# Patient Record
Sex: MALE | Race: WHITE | NOT HISPANIC OR LATINO | Employment: OTHER | URBAN - METROPOLITAN AREA
[De-identification: names, ages, dates, MRNs, and addresses within clinical notes are randomized per-mention and may not be internally consistent; named-entity substitution may affect disease eponyms.]

---

## 2017-06-24 ENCOUNTER — HOSPITAL ENCOUNTER (EMERGENCY)
Facility: HOSPITAL | Age: 27
Discharge: PSYCHIATRIC OR SUBSTANCE USE FACILITY | End: 2017-06-25
Attending: EMERGENCY MEDICINE | Admitting: EMERGENCY MEDICINE
Payer: MEDICAID
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
## 2017-06-24 DIAGNOSIS — T65.92XA SUICIDE ATTEMPT BY SUBSTANCE OVERDOSE, INITIAL ENCOUNTER: ICD-10-CM

## 2017-06-24 DIAGNOSIS — T42.4X2A POISONING BY BENZODIAZEPINES, INTENTIONAL SELF-HARM, INITIAL ENCOUNTER: ICD-10-CM

## 2017-06-24 DIAGNOSIS — F19.94 SUBSTANCE INDUCED MOOD DISORDER: ICD-10-CM
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
## 2017-06-24 DIAGNOSIS — T51.0X2A: ICD-10-CM

## 2017-06-24 DIAGNOSIS — T14.91XA SUICIDE ATTEMPT: Primary | ICD-10-CM

## 2017-06-24 LAB
ALBUMIN SERPL BCP-MCNC: 3.8 G/DL
ALP SERPL-CCNC: 68 U/L
ALT SERPL W/O P-5'-P-CCNC: 87 U/L
AMPHET+METHAMPHET UR QL: NEGATIVE
ANION GAP SERPL CALC-SCNC: 10 MMOL/L
APAP SERPL-MCNC: <3 UG/ML
AST SERPL-CCNC: 46 U/L
BARBITURATES UR QL SCN>200 NG/ML: NEGATIVE
BASOPHILS # BLD AUTO: 0.01 K/UL
BASOPHILS NFR BLD: 0.1 %
BENZODIAZ UR QL SCN>200 NG/ML: NORMAL
BILIRUB SERPL-MCNC: 0.4 MG/DL
BUN SERPL-MCNC: 16 MG/DL
BZE UR QL SCN: NORMAL
CALCIUM SERPL-MCNC: 8.7 MG/DL
CANNABINOIDS UR QL SCN: NORMAL
CHLORIDE SERPL-SCNC: 106 MMOL/L
CO2 SERPL-SCNC: 23 MMOL/L
CREAT SERPL-MCNC: 1 MG/DL
CREAT UR-MCNC: 107 MG/DL
DIFFERENTIAL METHOD: ABNORMAL
EOSINOPHIL # BLD AUTO: 0.1 K/UL
EOSINOPHIL NFR BLD: 0.9 %
ERYTHROCYTE [DISTWIDTH] IN BLOOD BY AUTOMATED COUNT: 13.4 %
EST. GFR  (AFRICAN AMERICAN): >60 ML/MIN/1.73 M^2
EST. GFR  (NON AFRICAN AMERICAN): >60 ML/MIN/1.73 M^2
ETHANOL SERPL-MCNC: <10 MG/DL
GLUCOSE SERPL-MCNC: 76 MG/DL
HCT VFR BLD AUTO: 50.4 %
HGB BLD-MCNC: 17.9 G/DL
LYMPHOCYTES # BLD AUTO: 3.5 K/UL
LYMPHOCYTES NFR BLD: 39.6 %
MCH RBC QN AUTO: 32.2 PG
MCHC RBC AUTO-ENTMCNC: 35.5 %
MCV RBC AUTO: 91 FL
METHADONE UR QL SCN>300 NG/ML: NEGATIVE
MONOCYTES # BLD AUTO: 0.9 K/UL
MONOCYTES NFR BLD: 10.2 %
NEUTROPHILS # BLD AUTO: 4.3 K/UL
NEUTROPHILS NFR BLD: 49.1 %
OPIATES UR QL SCN: NEGATIVE
PCP UR QL SCN>25 NG/ML: NEGATIVE
PLATELET # BLD AUTO: 191 K/UL
PMV BLD AUTO: 10 FL
POTASSIUM SERPL-SCNC: 4 MMOL/L
PROT SERPL-MCNC: 7.3 G/DL
RBC # BLD AUTO: 5.56 M/UL
SALICYLATES SERPL-MCNC: <5 MG/DL
SODIUM SERPL-SCNC: 139 MMOL/L
TOXICOLOGY INFORMATION: NORMAL
TSH SERPL DL<=0.005 MIU/L-ACNC: 1.09 UIU/ML
WBC # BLD AUTO: 8.73 K/UL

## 2017-06-24 PROCEDURE — 80320 DRUG SCREEN QUANTALCOHOLS: CPT

## 2017-06-24 PROCEDURE — 80053 COMPREHEN METABOLIC PANEL: CPT

## 2017-06-24 PROCEDURE — 84443 ASSAY THYROID STIM HORMONE: CPT

## 2017-06-24 PROCEDURE — 93010 ELECTROCARDIOGRAM REPORT: CPT | Mod: ,,, | Performed by: INTERNAL MEDICINE

## 2017-06-24 PROCEDURE — 85025 COMPLETE CBC W/AUTO DIFF WBC: CPT

## 2017-06-24 PROCEDURE — 80307 DRUG TEST PRSMV CHEM ANLYZR: CPT

## 2017-06-24 PROCEDURE — 81001 URINALYSIS AUTO W/SCOPE: CPT

## 2017-06-24 PROCEDURE — 80329 ANALGESICS NON-OPIOID 1 OR 2: CPT

## 2017-06-24 RX ORDER — ALPRAZOLAM 1 MG/1
1 TABLET ORAL 2 TIMES DAILY
Status: ON HOLD | COMMUNITY
End: 2017-07-26 | Stop reason: HOSPADM

## 2017-06-24 RX ORDER — ATORVASTATIN CALCIUM 10 MG/1
10 TABLET, FILM COATED ORAL DAILY
COMMUNITY
End: 2023-07-15 | Stop reason: CLARIF

## 2017-06-24 RX ORDER — HALOPERIDOL 5 MG/ML
5 INJECTION INTRAMUSCULAR EVERY 6 HOURS PRN
Status: DISCONTINUED | OUTPATIENT
Start: 2017-06-25 | End: 2017-06-24

## 2017-06-24 RX ORDER — HALOPERIDOL 5 MG/ML
5 INJECTION INTRAMUSCULAR EVERY 6 HOURS PRN
Status: DISCONTINUED | OUTPATIENT
Start: 2017-06-25 | End: 2017-06-25 | Stop reason: HOSPADM

## 2017-06-24 RX ORDER — LORAZEPAM 1 MG/1
2 TABLET ORAL EVERY 4 HOURS PRN
Status: DISCONTINUED | OUTPATIENT
Start: 2017-06-25 | End: 2017-06-24

## 2017-06-24 RX ORDER — LORAZEPAM 1 MG/1
2 TABLET ORAL EVERY 4 HOURS PRN
Status: DISCONTINUED | OUTPATIENT
Start: 2017-06-24 | End: 2017-06-25 | Stop reason: HOSPADM

## 2017-06-25 VITALS
DIASTOLIC BLOOD PRESSURE: 87 MMHG | RESPIRATION RATE: 16 BRPM | SYSTOLIC BLOOD PRESSURE: 137 MMHG | HEART RATE: 67 BPM | WEIGHT: 160 LBS | TEMPERATURE: 98 F | OXYGEN SATURATION: 97 %

## 2017-06-25 PROBLEM — F19.94 SUBSTANCE INDUCED MOOD DISORDER: Status: ACTIVE | Noted: 2017-06-25

## 2017-06-25 PROBLEM — T65.92XA SUICIDE ATTEMPT BY SUBSTANCE OVERDOSE: Status: ACTIVE | Noted: 2017-06-25

## 2017-06-25 LAB
BILIRUB UR QL STRIP: NEGATIVE
CLARITY UR REFRACT.AUTO: CLEAR
COLOR UR AUTO: YELLOW
GLUCOSE UR QL STRIP: NEGATIVE
HGB UR QL STRIP: ABNORMAL
KETONES UR QL STRIP: NEGATIVE
LEUKOCYTE ESTERASE UR QL STRIP: NEGATIVE
MICROSCOPIC COMMENT: NORMAL
NITRITE UR QL STRIP: NEGATIVE
PH UR STRIP: 6 [PH] (ref 5–8)
PROT UR QL STRIP: NEGATIVE
RBC #/AREA URNS AUTO: 1 /HPF (ref 0–4)
SP GR UR STRIP: 1.01 (ref 1–1.03)
URN SPEC COLLECT METH UR: ABNORMAL
UROBILINOGEN UR STRIP-ACNC: NEGATIVE EU/DL
WBC #/AREA URNS AUTO: 1 /HPF (ref 0–5)

## 2017-06-25 PROCEDURE — 99284 EMERGENCY DEPT VISIT MOD MDM: CPT | Mod: ,,, | Performed by: EMERGENCY MEDICINE

## 2017-06-25 PROCEDURE — 93005 ELECTROCARDIOGRAM TRACING: CPT

## 2017-06-25 PROCEDURE — 99285 EMERGENCY DEPT VISIT HI MDM: CPT | Mod: 25

## 2017-06-25 PROCEDURE — 90792 PSYCH DIAG EVAL W/MED SRVCS: CPT | Mod: AF,HB,S$PBB, | Performed by: PSYCHIATRY & NEUROLOGY

## 2017-06-25 NOTE — ED NOTES
"Patient stated he was engaged to his boyfriend who broke up with him.  Patient stated his phone is connected to his ex, and he sees him texting his new love interest.  Patient states he was very anxious, got his Xanax script filled, and took 60 mg tablets.  Patient stated "It's ok, I have a high tolerance because I've been on them since 2006".    "

## 2017-06-25 NOTE — ED NOTES
Informed by sitter that patient placed something in his mouth that she thought might be a pill. When patient asked if he placed a pill in his mouth, he became agitated and started accusing sitter of lying. Found to have a cigarette in his pocket, when patient was asked to throw the cigarette, he put it in his mouth a chewed it. Asked patient to provide urine specimen, he threw the urinal at me and said he will provide specimen when he's ready. Then jumped out of bed and began removing monitoring equipment and removed his IV. Security at bedside with RN and MD. Patient making accusations and yelling. Able to finally redirect patient and pressure applied to IV site. Patient cleaned of blood. Sitter and security remain at bedside

## 2017-06-25 NOTE — ED NOTES
"Pt was woken up to transport to [facility]. On the way to [facility], pt got up from wheelchair and tried to escape. Security walked pt back to wheelchair and handcuffed pt to wheelchair. Pt started crying and stating "I just wanted a drink of water. I'm confused, I don't know what's going on." Upon arrival to [facility] pt stood from wheelchair, was emotional and crying. Pt was able to ambulate to the side of bed in [facility]. Report was given to [facility] RN with paperwork.   "

## 2017-06-25 NOTE — ED NOTES
Resting quietly in bed with eyes closed with even and unlabored respirations.  Television on.  No distress noted.
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
## 2017-06-25 NOTE — ED PROVIDER NOTES
Encounter Date: 6/24/2017       History     Chief Complaint   Patient presents with    Suicide Attempt     pt presents following a suicide attempt in which he took 60 xanax and drank some whiskey. pt was found with a tie around his neck      Patient is a 28yo M with history of anxiety disorder who presents after a suicide attempt this evening. Patient was in his usual state of health until a verbal altercation with a significant other yesterday and this evening. Patient and EMS reported he took 60 tablets of Xanax (he normally takes twice daily) with a shot of whiskey, in an attempt to hurt himself. Patient denies other trauma or injury.       The history is provided by the patient and the EMS personnel.     Review of patient's allergies indicates:  Allergies not on file  No past medical history on file.  No past surgical history on file.  No family history on file.  Social History   Substance Use Topics    Smoking status: Not on file    Smokeless tobacco: Not on file    Alcohol use Not on file     Review of Systems   Constitutional: Negative for chills and fever.   Eyes: Negative for visual disturbance.   Respiratory: Negative for cough and shortness of breath.    Cardiovascular: Negative for chest pain.   Gastrointestinal: Negative for abdominal pain, constipation, diarrhea, nausea and vomiting.   Genitourinary: Negative for dysuria.   Skin: Negative for wound.   Neurological: Negative for dizziness, syncope and headaches.   Psychiatric/Behavioral: Positive for suicidal ideas.       Physical Exam     Initial Vitals [06/24/17 1946]   BP Pulse Resp Temp SpO2   (!) 144/88 (!) 114 18 98.2 °F (36.8 °C) 100 %      MAP       106.67         Physical Exam    Nursing note and vitals reviewed.  Constitutional: He appears well-developed and well-nourished. He is not diaphoretic. No distress.   GCS 15, calm, cooperative. No evidence of trauma.    HENT:   Head: Normocephalic and atraumatic.   Mouth/Throat: No oropharyngeal  exudate.   Eyes: EOM are normal. Pupils are equal, round, and reactive to light. Right eye exhibits no discharge. Left eye exhibits no discharge. No scleral icterus.   Neck: No tracheal deviation present. No JVD present.   Cardiovascular: Regular rhythm, normal heart sounds and intact distal pulses. Exam reveals no friction rub.    No murmur heard.  Tachycardic to 110s. Radial and dorsalis pedis pulses synchronous and symmetric bilaterally.    Pulmonary/Chest: Breath sounds normal. No respiratory distress. He has no wheezes. He has no rhonchi. He has no rales.   Abdominal: Soft. Bowel sounds are normal. He exhibits no distension. There is no tenderness. There is no guarding.   Musculoskeletal: He exhibits no edema or tenderness.   Lymphadenopathy:     He has no cervical adenopathy.   Neurological: He is alert and oriented to person, place, and time. No cranial nerve deficit.   Moves all extremities and carries on conversation. Tearful, but no neurologic deficits appreciated.    Skin: Skin is warm and dry. Capillary refill takes less than 2 seconds.   Psychiatric:   Tearful, with depressed mood. Admitted to suicide attempt.          ED Course   Procedures  Labs Reviewed   CBC W/ AUTO DIFFERENTIAL - Abnormal; Notable for the following:        Result Value    MCH 32.2 (*)     All other components within normal limits   COMPREHENSIVE METABOLIC PANEL - Abnormal; Notable for the following:     AST 46 (*)     ALT 87 (*)     All other components within normal limits   URINALYSIS - Abnormal; Notable for the following:     Occult Blood UA 1+ (*)     All other components within normal limits   ACETAMINOPHEN LEVEL - Abnormal; Notable for the following:     Acetaminophen (Tylenol), Serum <3.0 (*)     All other components within normal limits   SALICYLATE LEVEL - Abnormal; Notable for the following:     Salicylate Lvl <5.0 (*)     All other components within normal limits   TSH   DRUG SCREEN PANEL, URINE EMERGENCY    ALCOHOL,MEDICAL (ETHANOL)   URINALYSIS MICROSCOPIC     EKG Readings: (Independently Interpreted)   Initial Reading: No STEMI. Rhythm: Sinus Tachycardia. Heart Rate: 118.   Sinus tachycardia rate 188, Right leaning axis at 95. No interval changes. No enlargements. No acute ST segment changes.                 APC / Resident Notes:   PGY-2 MDM:   -Patient is a 27 y.o. presenting with a chief complaint of suicide attempt this evening. Vital signs stable, patient afebrile, tachycardic and non-hypoxic.   -Patient presents after admitting that he took 60 tablets of Xanax with a shot of whiskey today. Patient is alert, with GCS 15. He does not appear somnolent at this time. Will monitor closely and intervene for airway control as needed.   -Psychiatric clearance labs underway and PEC placed. Will discuss case with psychiatry after psychiatric labs and medical clearance.   -Patient will require blood pressure re-check in 1 week with primary care physician.     Workup ongoing, will continue to re-assess patient and update management as needed.     Haresh Dacosta DO  [employer] EM/IM PGY-2  6/24/2017 8:10 PM       Patient Care Update:  -Alcohol normal. We do not believe he took 60 Xanax based on his clinical picture. Contacted psychiatry, who notes he will meet inpatient needs though we do not have inpatient beds. Will initiate transfer order.     Haresh Dacosta DO  [employer] EM/IM PGY-2  6/24/2017 9:09 PM            Attending Attestation:   Physician Attestation Statement for Resident:  As the supervising MD   Physician Attestation Statement: I have personally seen and examined this patient.   I agree with the above history. -:   As the supervising MD I agree with the above PE.   -: + SI  No HI/AVH  A marked nonchalance while talking about events leading up to this  Poor insight  occ tangential and asking personal questions of staff  Awake/alert  No slurring, no nystagmus  No resp distress     As the supervising MD I agree with the  above treatment, course, plan, and disposition.   -: Reported overdose, though not presently demonstrating signs of sedation that would be expected with this dose, but w/ si nonetheless.  Screening labs, monitor for resp depression, psuych consult, re-eval.    I have reviewed and agree with the residents interpretation of the following: lab data and EKG.            Attending ED Notes:   Remains in no resp distress, no sedation noted.  Seen by psych, concur with pec. 10:28 PM      1:25 AM no acute medical process present on screening studies.           ED Course     Clinical Impression:   The primary encounter diagnosis was Suicide attempt. Diagnoses of Substance induced mood disorder and Suicide attempt by substance overdose, initial encounter were also pertinent to this visit.                           Ike Alejo MD  06/25/17 [id number]

## 2017-06-25 NOTE — ED NOTES
Patient brought to [facility] by security and Er nurse with no belongings. Patient searched for contraband. Patient walked to bed crying he saying he does not want to stay here. He went to flop down on bed and partially missed the bed and fell on buttocks and tap head on wall. Patient reports he did not hurt himself. Assessment done.  Dr. Valerio called 0213 and informed of fall. Vitals stable. Patient denies any pain or injury and none noted.  Fall occurred around 0210.

## 2017-06-25 NOTE — ED NOTES
Per Fran, pt has been placed at [facility] under the care of Dr. Nassar. The number to call report is [phone].

## 2017-06-25 NOTE — ED NOTES
"Received report from [facility] nurse.  Reported patient slept during night.  Reported patient's mother took belongings home.  Patient awake requesting to brush teeth.  Toothpaste and brush provided.  Patient stated "I was out of it when I ran from the wheelchair earlier".  Patient states no longer suicidal, but unsure if he wants inpatient care.  "

## 2017-06-25 NOTE — ED NOTES
PT requested that his belongings be given to his mother (Frances Austin).  Ms. Austin took possession of the patient's bag, which included his cell phone, shoes, and shorts.

## 2017-06-25 NOTE — ED NOTES
Report called to Fran at [facility].  Patient using telephone to inform his mother of his transfer.

## 2017-06-25 NOTE — ED NOTES
Faxed packet to [facility], [facility], [facility], [facility], [facility], [facility], [facility], [facility], [facility], [facility], [facility], [facility], [facility], [facility], [place], [facility], [facility], [facility], [facility], [facility], [facility], [facility], [facility], [facility]

## 2017-06-25 NOTE — ED NOTES
Packet faxed to psychiatric hospitals on [facility] placement list. Awaiting call back. Will continue to seek placement.

## 2017-06-25 NOTE — ED TRIAGE NOTES
Pt brought in by EMS for SI. Pt states that he took 60mg of xanax approximately 1.5 hours ago. Pt states that he drank 1 beer and a shot of whiskey. Pt states that he was talking to ex boyfriend today and found out that ex had been cheating on him, pt states that after encounter with ex he started having SI. Pt states that he thought about sticking his head in the oven, hanging himself and decided to take his xanax instead.

## 2017-07-20 ENCOUNTER — HOSPITAL ENCOUNTER (INPATIENT)
Facility: HOSPITAL | Age: 27
LOS: 6 days | Discharge: HOME | DRG: 881 | End: 2017-07-26
Attending: PSYCHIATRY & NEUROLOGY | Admitting: PSYCHIATRY & NEUROLOGY
Payer: MEDICAID

## 2017-07-20 DIAGNOSIS — F41.1 GAD (GENERALIZED ANXIETY DISORDER): Chronic | ICD-10-CM

## 2017-07-20 DIAGNOSIS — F32.A DEPRESSION: ICD-10-CM

## 2017-07-20 DIAGNOSIS — F90.0 ATTENTION DEFICIT HYPERACTIVITY DISORDER (ADHD), PREDOMINANTLY INATTENTIVE TYPE: Chronic | ICD-10-CM

## 2017-07-20 DIAGNOSIS — F32.A DEPRESSION, UNSPECIFIED DEPRESSION TYPE: ICD-10-CM

## 2017-07-20 DIAGNOSIS — F17.200 TOBACCO USE DISORDER: Chronic | ICD-10-CM

## 2017-07-20 LAB
AMPHET+METHAMPHET UR QL: NEGATIVE
BARBITURATES UR QL SCN>200 NG/ML: NEGATIVE
BENZODIAZ UR QL SCN>200 NG/ML: NEGATIVE
BZE UR QL SCN: NEGATIVE
CANNABINOIDS UR QL SCN: NEGATIVE
CREAT UR-MCNC: 93 MG/DL
ETHANOL UR-MCNC: <10 MG/DL
METHADONE UR QL SCN>300 NG/ML: NEGATIVE
OPIATES UR QL SCN: NEGATIVE
PCP UR QL SCN>25 NG/ML: NEGATIVE
TOXICOLOGY INFORMATION: NORMAL

## 2017-07-20 PROCEDURE — 80307 DRUG TEST PRSMV CHEM ANLYZR: CPT

## 2017-07-20 PROCEDURE — 99223 1ST HOSP IP/OBS HIGH 75: CPT | Mod: AF,HB,, | Performed by: PSYCHIATRY & NEUROLOGY

## 2017-07-20 PROCEDURE — 12400001 HC PSYCH SEMI-PRIVATE ROOM

## 2017-07-20 PROCEDURE — 25000003 PHARM REV CODE 250: Performed by: PSYCHIATRY & NEUROLOGY

## 2017-07-20 RX ORDER — HYDROXYZINE PAMOATE 25 MG/1
25 CAPSULE ORAL EVERY 8 HOURS PRN
Status: DISCONTINUED | OUTPATIENT
Start: 2017-07-20 | End: 2017-07-21

## 2017-07-20 RX ORDER — NICOTINE 7MG/24HR
1 PATCH, TRANSDERMAL 24 HOURS TRANSDERMAL DAILY
Status: DISCONTINUED | OUTPATIENT
Start: 2017-07-21 | End: 2017-07-26 | Stop reason: HOSPADM

## 2017-07-20 RX ORDER — HYDROXYZINE PAMOATE 25 MG/1
25 CAPSULE ORAL NIGHTLY
Status: DISCONTINUED | OUTPATIENT
Start: 2017-07-20 | End: 2017-07-21

## 2017-07-20 RX ORDER — NICOTINE 7MG/24HR
PATCH, TRANSDERMAL 24 HOURS TRANSDERMAL
Status: DISCONTINUED
Start: 2017-07-20 | End: 2017-07-20 | Stop reason: WASHOUT

## 2017-07-20 RX ORDER — HYDROXYZINE PAMOATE 25 MG/1
25 CAPSULE ORAL EVERY 8 HOURS PRN
Status: DISCONTINUED | OUTPATIENT
Start: 2017-07-20 | End: 2017-07-20

## 2017-07-20 RX ORDER — ATORVASTATIN CALCIUM 10 MG/1
10 TABLET, FILM COATED ORAL DAILY
Status: DISCONTINUED | OUTPATIENT
Start: 2017-07-21 | End: 2017-07-26 | Stop reason: HOSPADM

## 2017-07-20 RX ORDER — ALPRAZOLAM 1 MG/1
1 TABLET ORAL 2 TIMES DAILY
Status: DISCONTINUED | OUTPATIENT
Start: 2017-07-20 | End: 2017-07-21

## 2017-07-20 RX ADMIN — HYDROXYZINE PAMOATE 25 MG: 25 CAPSULE ORAL at 09:07

## 2017-07-20 RX ADMIN — ALPRAZOLAM 1 MG: 1 TABLET ORAL at 09:07

## 2017-07-20 NOTE — PROGRESS NOTES
Pt received to unit via stretcher with EMS services transporting. Pt mood is calm and cooperative. He was changed into unit provided paper scrubs and searched for contraband with none noted. Personal belongings retrieved and searched for contraband. On call resident notified of admission. [facility] notified of PEC and original document filed in pt chart. Dietary tray ordered ([id number]).

## 2017-07-21 PROBLEM — F19.94 SUBSTANCE INDUCED MOOD DISORDER: Status: RESOLVED | Noted: 2017-06-25 | Resolved: 2017-07-21

## 2017-07-21 PROBLEM — F17.200 TOBACCO USE DISORDER: Chronic | Status: ACTIVE | Noted: 2017-07-21

## 2017-07-21 PROBLEM — F41.1 GAD (GENERALIZED ANXIETY DISORDER): Chronic | Status: ACTIVE | Noted: 2017-07-21

## 2017-07-21 PROBLEM — F90.0 ATTENTION DEFICIT HYPERACTIVITY DISORDER (ADHD), PREDOMINANTLY INATTENTIVE TYPE: Chronic | Status: ACTIVE | Noted: 2017-07-21

## 2017-07-21 LAB
ALBUMIN SERPL BCP-MCNC: 3.7 G/DL
ALP SERPL-CCNC: 57 U/L
ALT SERPL W/O P-5'-P-CCNC: 81 U/L
ANION GAP SERPL CALC-SCNC: 9 MMOL/L
AST SERPL-CCNC: 35 U/L
BASOPHILS # BLD AUTO: 0.02 K/UL
BASOPHILS NFR BLD: 0.2 %
BILIRUB SERPL-MCNC: 0.3 MG/DL
BUN SERPL-MCNC: 14 MG/DL
CALCIUM SERPL-MCNC: 9.4 MG/DL
CHLORIDE SERPL-SCNC: 104 MMOL/L
CHOLEST/HDLC SERPL: 7.4 {RATIO}
CO2 SERPL-SCNC: 25 MMOL/L
CREAT SERPL-MCNC: 1.1 MG/DL
DIFFERENTIAL METHOD: ABNORMAL
EOSINOPHIL # BLD AUTO: 0.1 K/UL
EOSINOPHIL NFR BLD: 1.5 %
ERYTHROCYTE [DISTWIDTH] IN BLOOD BY AUTOMATED COUNT: 13.3 %
EST. GFR  (AFRICAN AMERICAN): >60 ML/MIN/1.73 M^2
EST. GFR  (NON AFRICAN AMERICAN): >60 ML/MIN/1.73 M^2
ESTIMATED AVG GLUCOSE: 100 MG/DL
FOLATE SERPL-MCNC: 11.4 NG/ML
GLUCOSE SERPL-MCNC: 87 MG/DL
HBA1C MFR BLD HPLC: 5.1 %
HCT VFR BLD AUTO: 49.8 %
HDL/CHOLESTEROL RATIO: 13.5 %
HDLC SERPL-MCNC: 282 MG/DL
HDLC SERPL-MCNC: 38 MG/DL
HGB BLD-MCNC: 17.8 G/DL
HIV1+2 IGG SERPL QL IA.RAPID: NEGATIVE
LDLC SERPL CALC-MCNC: 176.6 MG/DL
LYMPHOCYTES # BLD AUTO: 4.5 K/UL
LYMPHOCYTES NFR BLD: 50.2 %
MCH RBC QN AUTO: 32.7 PG
MCHC RBC AUTO-ENTMCNC: 35.7 G/DL
MCV RBC AUTO: 91 FL
MONOCYTES # BLD AUTO: 0.9 K/UL
MONOCYTES NFR BLD: 10.4 %
NEUTROPHILS # BLD AUTO: 3.4 K/UL
NEUTROPHILS NFR BLD: 37.5 %
NONHDLC SERPL-MCNC: 244 MG/DL
PLATELET # BLD AUTO: 187 K/UL
PMV BLD AUTO: 9.9 FL
POTASSIUM SERPL-SCNC: 4.2 MMOL/L
PROT SERPL-MCNC: 7 G/DL
RBC # BLD AUTO: 5.45 M/UL
SODIUM SERPL-SCNC: 138 MMOL/L
T3 SERPL-MCNC: 113 NG/DL
T4 SERPL-MCNC: 4.3 UG/DL
TRIGL SERPL-MCNC: 337 MG/DL
TSH SERPL DL<=0.005 MIU/L-ACNC: 1.8 UIU/ML
VIT B12 SERPL-MCNC: 420 PG/ML
WBC # BLD AUTO: 8.96 K/UL

## 2017-07-21 PROCEDURE — 25000003 PHARM REV CODE 250: Performed by: PSYCHIATRY & NEUROLOGY

## 2017-07-21 PROCEDURE — 83036 HEMOGLOBIN GLYCOSYLATED A1C: CPT

## 2017-07-21 PROCEDURE — 86703 HIV-1/HIV-2 1 RESULT ANTBDY: CPT

## 2017-07-21 PROCEDURE — 82746 ASSAY OF FOLIC ACID SERUM: CPT

## 2017-07-21 PROCEDURE — 12400001 HC PSYCH SEMI-PRIVATE ROOM

## 2017-07-21 PROCEDURE — 97150 GROUP THERAPEUTIC PROCEDURES: CPT

## 2017-07-21 PROCEDURE — 97165 OT EVAL LOW COMPLEX 30 MIN: CPT

## 2017-07-21 PROCEDURE — 80061 LIPID PANEL: CPT

## 2017-07-21 PROCEDURE — 80053 COMPREHEN METABOLIC PANEL: CPT

## 2017-07-21 PROCEDURE — 84480 ASSAY TRIIODOTHYRONINE (T3): CPT

## 2017-07-21 PROCEDURE — 90853 GROUP PSYCHOTHERAPY: CPT | Mod: HP,HB,, | Performed by: PSYCHOLOGIST

## 2017-07-21 PROCEDURE — 85025 COMPLETE CBC W/AUTO DIFF WBC: CPT

## 2017-07-21 PROCEDURE — 84443 ASSAY THYROID STIM HORMONE: CPT

## 2017-07-21 PROCEDURE — 36415 COLL VENOUS BLD VENIPUNCTURE: CPT

## 2017-07-21 PROCEDURE — 82607 VITAMIN B-12: CPT

## 2017-07-21 PROCEDURE — 84436 ASSAY OF TOTAL THYROXINE: CPT

## 2017-07-21 RX ORDER — BUPROPION HYDROCHLORIDE 150 MG/1
150 TABLET ORAL DAILY
Status: DISCONTINUED | OUTPATIENT
Start: 2017-07-21 | End: 2017-07-26 | Stop reason: HOSPADM

## 2017-07-21 RX ORDER — HYDROXYZINE PAMOATE 50 MG/1
50 CAPSULE ORAL EVERY 6 HOURS PRN
Status: DISCONTINUED | OUTPATIENT
Start: 2017-07-21 | End: 2017-07-26 | Stop reason: HOSPADM

## 2017-07-21 RX ORDER — ESCITALOPRAM OXALATE 20 MG/1
20 TABLET ORAL DAILY
Status: DISCONTINUED | OUTPATIENT
Start: 2017-07-21 | End: 2017-07-26 | Stop reason: HOSPADM

## 2017-07-21 RX ORDER — RAMELTEON 8 MG/1
8 TABLET ORAL NIGHTLY PRN
Status: DISCONTINUED | OUTPATIENT
Start: 2017-07-21 | End: 2017-07-26 | Stop reason: HOSPADM

## 2017-07-21 RX ADMIN — ESCITALOPRAM 20 MG: 20 TABLET, FILM COATED ORAL at 12:07

## 2017-07-21 RX ADMIN — NICOTINE 1 PATCH: 7 PATCH, EXTENDED RELEASE TRANSDERMAL at 08:07

## 2017-07-21 RX ADMIN — RAMELTEON 8 MG: 8 TABLET, FILM COATED ORAL at 08:07

## 2017-07-21 RX ADMIN — ATORVASTATIN CALCIUM 10 MG: 10 TABLET, FILM COATED ORAL at 08:07

## 2017-07-21 RX ADMIN — BUPROPION HYDROCHLORIDE 150 MG: 150 TABLET, FILM COATED, EXTENDED RELEASE ORAL at 12:07

## 2017-07-21 RX ADMIN — ALPRAZOLAM 1 MG: 1 TABLET ORAL at 08:07

## 2017-07-21 RX ADMIN — HYDROXYZINE PAMOATE 50 MG: 50 CAPSULE ORAL at 01:07

## 2017-07-21 RX ADMIN — HYDROXYZINE PAMOATE 50 MG: 50 CAPSULE ORAL at 08:07

## 2017-07-21 NOTE — PROGRESS NOTES
megan spoke with pt's mom, Mrs.Nina Austin. Per report, pt was a difficult child. Pt would get easily overwhelmed with life decisions until his adulthood. Mom has been enabling pt to some degree but she wants to change how she responds to his manipulation at times.  Mom discussed past suicidal attempts by pt and reports pt did it without a serious intention to kill himself. Mom spoke about the xanax overdose but not certain if in fact he took the pills.  Pt has not opened up completely with mom regarding drug use (sw did not disclose further info). However mom is pleased to know that pt is open to getting on a good med regimen and continuation of therapy to address long standing issues. Mom reports, pt has grandiose ideas about making it big without doing the hard work or taking the small steps to get there.    Pt is not currently working but has had financial difficulties paying his bills and expects mom to come his aid.    Mom will visit pt on the weekend to give him a break.

## 2017-07-21 NOTE — PROGRESS NOTES
Pt admitted to the unit calm and cooperative, introduced to staff, oriented to unit, phone times, and visitation times. Signed all legal documents, instructed on collection of urine for UDS, MD notified of admit. Pt had dinner in group with active participation. He denies SI/HI at this time. Pt is a little anxious he admits to taking Xanax BID for over a year. Also a ppd smoker. MVC in place will continue to monitor.

## 2017-07-21 NOTE — PROGRESS NOTES
Acute Psychiatric Unit  Psychosocial History and Assessment  Progress Note      Patient Name: Matthew Austin YOB: 1990 SW: Alice Tomas, [facility] Date: 7/21/2017    Chief Complaint: suicidal ideation    Consent:     Did the patient consent for an interview with the ? Yes    Did the patient consent for the  to contact family/friend/caregiver?   Yes  {[facility] PSYCHOSOCIAL ASSESS CONSENT RELATION OHS:26717}    Did the patient give consent for the  to inform family/friend/caregiver of his/her whereabouts or to discuss discharge planning? {YES:49056}    Source of Information: {[facility] PSYCHOSOCIAL ASSESS SOURCE OHS:3042}    Is information obtained from interviews considered reliable?   {RESPONSE; YES/NO/NA:89158}    Reason for Admission:     Active Hospital Problems    Diagnosis  POA    Depression [F32.9]  Yes      Resolved Hospital Problems    Diagnosis Date Resolved POA   No resolved problems to display.       History of Present Illness - (Patient Perception):   {[facility] PSYCHOSOCIAL ASSESS PT PERCEPTION OHS:59738}    History of Present Illness - (Perception of Others): *** according to ***    Present biopsychosocial functioning: ***    Past biopsychosocial functioning: ***    Family and Marital/Relationship History:     Significant Other/Partner Relationships:  {[facility] PSYCHOSOCIAL ASSESS RELATIONSHIP:3043}    Family Relationships: {[facility] PSYCHOSOCIAL ASSESS FAMILY ASSESS OHS:3044}    Culture and Confucianist:     Confucianist: Pentecostalism    How strong of a role does Jew and spirituality play in patient's life? ***    Gnosticist or spiritual concerns regarding treatment: {Gnosticist/Cultural:833734}    History of Abuse:   History of Abuse: {[facility] PSYCHOSOCIAL ASSESS ABUSE HX OHS:3045}    Outcome: ***    Psychiatric and Medical History:     History of psychiatric illness or treatment: { Psych History:37403}    Medical history:   Past Medical History:   Diagnosis Date    Anxiety      Depression     History of psychiatric hospitalization     Hx of psychiatric care     Hypertension     Elayne     Psychiatric problem     Suicide attempt     Therapy        Substance Abuse History:     Alcohol - (Patient Perspective):   History   Alcohol Use    Yes     Comment: social       Alcohol - (Collateral Perspective): *** according to ***    Drugs - (Patient Perspective):   History   Drug Use    Types: Marijuana       Drugs - (Collateral Perspective): *** according to ***    Additional Comments: ***    Education:     Currently Enrolled? {[facility] PSYCHOSOCIAL ASSESS EDUCATION OHS:88081}    Special Education? {YES:29065}    Interested in Completing Education/GED: {YES:01156}    Employment and Financial:     Currently employed? {[facility] PSYCHOSOCIAL ASSESS EMPLOYMENT OHS:39915}    Source of Income: {SOURCE OF INCOME:10827}    Able to afford basic needs (food, shelter, utilities)? {YES:78412}    Who manages finances/personal affairs? ***      Service:     ? {status:13197}    Combat Service? {YES:80146}     Community Resources:     Describe present use of community resources: ***     Identify previously used community resources   (Include previous mental health treatment - outpatient and inpatient): ***    Environmental:     Current living situation:{Living Arrangements:03740}    Social Evaluation:     [facility] Ass[facility]: [facility]5635[facility]30}    Patient Limitations: ***    High risk psychosocial issues that may impact discharge planning:   {[facility] PSYCHOSOCIAL ASSESS HIGH RISK ISSUES OHS:3047}    Recommendations:     Anticipated discharge plan:   {[facility] PSYCHOSOCIAL ASSESS DISCHARGE PLAN OHS:39147}    High risk issues requiring early treatment planning and immediate intervention: ***    Community resources needed for discharge planning:  {[facility] PSYCHOSOCIAL ASSESS COM RESOURCES OHS:3049}    Anticipated social work role(s) in treatment and discharge planning: ***

## 2017-07-21 NOTE — PROGRESS NOTES
Pt had 8 hours of sleep. Pt remain free from fall or injury during HS. Continue to monitor pt safety and POC.

## 2017-07-21 NOTE — PROGRESS NOTES
07/21/17 0900 07/21/17 1100 07/21/17 1400   [facility] Group Therapy   Group Name Community Reintegration (pet therapy) Therapeutic Recreation   Specific Interventions Current Events Sensory Stimulation (basketball toss)   Participation Level Active;Appropriate;Attentive Active;Appropriate;Attentive Active;Appropriate;Attentive   Participation Quality Cooperative;Social Cooperative;Social Cooperative;Social   Insight/Motivation Good Good Good   Affect/Mood Display Appropriate Appropriate Appropriate   Cognition Alert;Oriented Alert;Oriented Alert;Oriented
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
## 2017-07-21 NOTE — PROGRESS NOTES
Group Psychotherapy (PhD/LCSW)    Site: [facility]    Clinical status of patient: Inpatient    Date: 7/21/2017    Group Focus: Life Skills    Length of service: 37549 - 35-40 minutes    Number of patients in attendance: 9    Referred by: Acute Psychiatry Unit Treatment Team    Target symptoms: Depression and Anxiety    Patient's response to treatment: Active Listening    Progress toward goals: Progressing slowly    Interval History: Pt appeared attentive and appropriate in group. Pt participated briefly but appropriately in a group discussion of interpersonal problem solving strategies.     Diagnosis: Major Depressive d/o, single episode, unspecified.  EDEN    Plan: Continue treatment on APU

## 2017-07-21 NOTE — PLAN OF CARE
"Problem: Patient Care Overview (Adult)  Goal: Plan of Care Review  Outcome: Ongoing (interventions implemented as appropriate)  POC discussed with pt, calm and cooperative on the unit. Follows direction and attends group with active participation. Med compliant, good hygiene,and good appetite. Denies SI/HI/AVH,flat affect, thoughts are linear and future oriented. Mood is good. Out visible on the unit. Safety plan reviewed and environmental rounds done. Reviewed medicine with pt will require further instruction. Pt given time to ask questions, all questions answered. MVC in place will continue to monitor.     Problem: Depression (Adult,Obstetrics,Pediatric)  Goal: Identify Related Risk Factors and Signs and Symptoms  Related risk factors and signs and symptoms are identified upon initiation of Human Response Clinical Practice Guideline (CPG)  Outcome: Ongoing (interventions implemented as appropriate)  Pt c/o feeling down and periods of no energy prior to admit. Inability to concentrate problems with personal relationships and difficulty sleeping.   Goal: Establish/Maintain Self-Care Routine  Patient will demonstrate the desired outcomes by discharge/transition of care.  Outcome: Ongoing (interventions implemented as appropriate)  Pt has good hygiene and participates in ADL's.   Goal: Improved/Stable Mood  Patient will demonstrate the desired outcomes by discharge/transition of care.  Outcome: Ongoing (interventions implemented as appropriate)  Pt mood stable he denies SI at this time, states he is here to "get his mood stabilized."      "
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
## 2017-07-22 PROCEDURE — 25000003 PHARM REV CODE 250: Performed by: PSYCHIATRY & NEUROLOGY

## 2017-07-22 PROCEDURE — 99233 SBSQ HOSP IP/OBS HIGH 50: CPT | Mod: AF,HB,S$PBB, | Performed by: PSYCHIATRY & NEUROLOGY

## 2017-07-22 PROCEDURE — 12400001 HC PSYCH SEMI-PRIVATE ROOM

## 2017-07-22 RX ADMIN — HYDROXYZINE PAMOATE 50 MG: 50 CAPSULE ORAL at 08:07

## 2017-07-22 RX ADMIN — BUPROPION HYDROCHLORIDE 150 MG: 150 TABLET, FILM COATED, EXTENDED RELEASE ORAL at 08:07

## 2017-07-22 RX ADMIN — ESCITALOPRAM 20 MG: 20 TABLET, FILM COATED ORAL at 08:07

## 2017-07-22 RX ADMIN — HYDROXYZINE PAMOATE 50 MG: 50 CAPSULE ORAL at 07:07

## 2017-07-22 RX ADMIN — RAMELTEON 8 MG: 8 TABLET, FILM COATED ORAL at 08:07

## 2017-07-22 RX ADMIN — ATORVASTATIN CALCIUM 10 MG: 10 TABLET, FILM COATED ORAL at 08:07

## 2017-07-22 RX ADMIN — NICOTINE 1 PATCH: 7 PATCH, EXTENDED RELEASE TRANSDERMAL at 08:07

## 2017-07-22 NOTE — HOSPITAL COURSE
7/20: Admitted for depressed mood and wanting to get his medications adjusted, began cross taper from Celexa to Lexapro and initiated Wellbutrin.  7/22: tolerating regimen well, no changes made. Received Ramelteon PRN insomnia and Vistaril PRN anxiety x2.  7/23: Tolerating regimen well. Received Ramelteon PRN insomnia and Vistaril PRN anxiety x2. Endorsing being sleepy and sweaty throughout the night.  7/26/17: Patient reports that he feels better and that his medications are helping him. He endorses good sleep and appetite and denies side effects to the medications. He is attending to his ADLs and has shown calm and cooperative behavior on the unit. He exhibits a linear, logical thought process. Denies suicidal thoughts, homicidal thoughts and reports a good mood and has a congruent affect.

## 2017-07-22 NOTE — PLAN OF CARE
Problem: Patient Care Overview (Adult)  Goal: Plan of Care Review  Outcome: Ongoing (interventions implemented as appropriate)  Pt denies SI/HI/AV hallucinations. Pt is quiet and guarded, endorses depression but contracts for safety. Pt visible on the unit, can be seen interacting appropriately with staff and peers. Pt is friendly on approach and compliant with unit rules and medications. Pt monitored for safety, will continue to monitor.

## 2017-07-22 NOTE — ASSESSMENT & PLAN NOTE
Continue crosstaper celexa to lexapro which should increase efficacy - this is to target EDEN, ?OCD vs OCPD, and depression.  Will begin trial of wellbutrin xl to target depression and smoking cessation.

## 2017-07-22 NOTE — PROGRESS NOTES
07/22/17 0900 07/22/17 1000   [facility] Group Therapy   Group Name Community Reintegration Therapeutic Recreation   Specific Interventions Current Events (Pictionary)   Participation Level Active;Supportive;Appropriate Active;Supportive;Appropriate;Attentive;Sharing   Participation Quality Social;Cooperative Social;Cooperative   Insight/Motivation Good Good   Affect/Mood Display Appropriate Bizarre;Bright   Cognition Alert;Oriented Alert;Oriented
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
## 2017-07-22 NOTE — PROGRESS NOTES
[facility]  Psychiatry  Progress Note    Patient Name: Matthew Austin  MRN: [id number]   Code Status: No Order  Admission Date: 2017  Hospital Length of Stay: 2 days  Expected Discharge Date:   Attending Physician: Humberto Belle MD  Primary Care Provider: Primary Doctor No    Current Legal Status: [facility]    Patient information was obtained from patient.     Subjective:     Principal Problem:Depression    HPI: Matthew Austin is a 27 y.o. male with past psychiatric history of EDEN, OCD, Cluster B Personality traits with no specific diagnosis, and ADHD currently admitted with depression.     On my interview: Patient was seen in the dining area this evening. He reports that he came to the hospital due to a depressed mood and wanting to get his medications adjusted. He reports that he has been dealing with a depressed mood for the past year since his Father  however this has became increasingly worse over the past three months. He reports that over those past three months he has attempted suicide three times. He reports that his most recent suicide attempt was in  when he overdosed on 60 Xanax pills. He was hospitalized at [facility] and discharged with Celexa 20 mg daily, Trazodone unknown dose, Vistaril unknown dose, Xanax 1 mg BID, and Adderall 60 mg daily.     He reports that he felt well a week after he was discharged from the hospital however he became increasingly depressed again after that. He endorses poor sleep, over eating due to stress, decreased concentration, guilt, hopelessness, and anhedonia. He was seen by his Therapist today and she recommended that he be admitted to the hospital.      Patient has a past psychiatric history of two hospitalizations. Once after becoming manic secondary to Chantix and another time after his recent suicide attempt. He has been on Celexa, Xanax, Trazodone, Adderall, and Vistaril in the past. He does not believe that the Celexa has been  "helpful and stopped taking the Trazodone due to bad dreams and teeth clinching. He reports that he takes Xanax for panic attacks that he would have daily without the medication.      Substance use history is significant for marijuana and alcohol. He reports that he drinks socially three times a week and will drink to excess once a month due to stress. He reports a history of Rehab once when he was 15 years old so that he could finish high school early.      Patient currently denies symptoms of km or psychosis. Patient denies SI/HI/AVH.    Hospital Course: 7/20: Admitted for depressed mood and wanting to get his medications adjusted, began cross taper from Celexa to Lexapro and initiated Wellbutrin. 7/22: tolerating regimen well, no changes made. Received Ramelteon PRN insomnia and Vistaril PRN anxiety x2.    Subjective  Overnight per staff:  Compliant with medications  No acute events  No PRNs given for agitation, received Vistaril x2 PRN anxiety and Ramelteon PRN insomnia with good effect     On interview, patient states has been depressed but "I was PEC'd just because I knew I otherwise wouldn't get help."  Last used THC approx 1 mo ago, Xanax 1 week ago after it ran out. Outpatient psychiatrist has been Dr Alejandre, last seen February; reports has missed many appointments since and unsure if he can return there for follow up. Previously on Wellbutrin in high school, unsure if with good effect. Says Adderall (takes "to get motivated") makes anxiety worse if doesn't have Xanax. Reports poor sleep previously with Trazodone. Is optimistic current regimen will help his mood.     Reports he slept well, per staff 8 hours. Reports good appetite. Good self care.   Reports no adverse effects.   Continue current management.       Objective    VITALS   Vitals:    07/22/17 0725   BP: (!) 142/94   Pulse: (!) 54   Resp: 16   Temp: 97.7 °F (36.5 °C)       Current Facility-Administered Medications:     acetaminophen chewable " tablet 80 mg, 80 mg, Oral, Q6H PRN, Derek Ferris DO    atorvastatin tablet 10 mg, 10 mg, Oral, Daily, Derek Ferris DO, 10 mg at 07/22/17 0812    buPROPion TB24 tablet 150 mg, 150 mg, Oral, Daily, Humberto Belle MD, 150 mg at 07/22/17 0812    docusate sodium capsule 50 mg, 50 mg, Oral, BID PRN, Derek Ferris DO    escitalopram oxalate tablet 20 mg, 20 mg, Oral, Daily, Humberto Belle MD, 20 mg at 07/22/17 0812    hydrOXYzine pamoate capsule 50 mg, 50 mg, Oral, Q6H PRN, Humberto Belle MD, 50 mg at 07/22/17 0815    nicotine 7 mg/24 hr 1 patch, 1 patch, Transdermal, Daily, Derek Ferris DO, 1 patch at 07/22/17 0812    ramelteon tablet 8 mg, 8 mg, Oral, Nightly PRN, Humberto Belle MD, 8 mg at 07/21/17 2050      Mental Status Exam  General appearance and behavior: No acute distress, age appropriate, well appearing, well cared for, dressed neatly and appropriately, cooperative, engaged  Level of Consciousness: awake , alert  Attention: intact, not distractible, Attends to interview without distraction  Orientation: intact to conversation  Psychomotor Behavior: no retardation or agitation  Speech: not rapid or pressured, conversational, normal rate, tone, and articulation of speech  Language: prosody intact, spontaneous, non-spontaneous, and appropriate without evidence of neologisms or gross idiosyncrasies   Mood: Neutral  Affect: dysphoric   Thought Process: clear, logical, goal directed, without evidence of unwarranted suspicion, over generalization, or fragmentation, no flight of ideas   Associations: intact, no loosening of associations   Thought Content: denies suicidal/homicidal ideation, denies plan or intent for self harm or harm to others; no evidence of hallucinations, or delusions.    Insight:fair  Judgment: fair      Assessment/Plan:     Attention deficit hyperactivity disorder (ADHD), predominantly inattentive type    Hold adderall at this time 2/2  addictive potential - can be judiciously reintroduced in future if need be.        EDEN (generalized anxiety disorder)    Continue crosstaper celexa to lexapro which should increase efficacy - this is to target EDEN, ?OCD vs OCPD, and depression.  Will begin trial of wellbutrin xl to target depression and smoking cessation.  Will hold xanax (has been off x1 week prior to admit) 2/2 addictive potential - can be judiciously reintroduced in future if need be.  Begin melatonin for sleep.  Cont vistaril as prn for breakthrough anxiety and insomnia.          * Depression    Continue crosstaper celexa to lexapro which should increase efficacy - this is to target EDEN, ?OCD vs OCPD, and depression.  Will begin trial of wellbutrin xl to target depression and smoking cessation.             Need for Continued Hospitalization:   Requires ongoing hospitalization for stabilization of medications.    Anticipated Disposition: Admitted as an Inpatient    Mary Elias MD   Psychiatry  [facility]

## 2017-07-22 NOTE — ASSESSMENT & PLAN NOTE
Continue crosstaper celexa to lexapro which should increase efficacy - this is to target EDEN, ?OCD vs OCPD, and depression.  Will begin trial of wellbutrin xl to target depression and smoking cessation.  Will hold xanax (has been off x1 week prior to admit) 2/2 addictive potential - can be judiciously reintroduced in future if need be.  Begin melatonin for sleep.  Cont vistaril as prn for breakthrough anxiety and insomnia.

## 2017-07-22 NOTE — HPI
Matthew Austin is a 27 y.o. male with past psychiatric history of EDEN, OCD, Cluster B Personality traits with no specific diagnosis, and ADHD currently admitted with depression.     On my interview: Patient was seen in the dining area this evening. He reports that he came to the hospital due to a depressed mood and wanting to get his medications adjusted. He reports that he has been dealing with a depressed mood for the past year since his Father  however this has became increasingly worse over the past three months. He reports that over those past three months he has attempted suicide three times. He reports that his most recent suicide attempt was in  when he overdosed on 60 Xanax pills. He was hospitalized at [facility] and discharged with Celexa 20 mg daily, Trazodone unknown dose, Vistaril unknown dose, Xanax 1 mg BID, and Adderall 60 mg daily.     He reports that he felt well a week after he was discharged from the hospital however he became increasingly depressed again after that. He endorses poor sleep, over eating due to stress, decreased concentration, guilt, hopelessness, and anhedonia. He was seen by his Therapist today and she recommended that he be admitted to the hospital.      Patient has a past psychiatric history of two hospitalizations. Once after becoming manic secondary to Chantix and another time after his recent suicide attempt. He has been on Celexa, Xanax, Trazodone, Adderall, and Vistaril in the past. He does not believe that the Celexa has been helpful and stopped taking the Trazodone due to bad dreams and teeth clinching. He reports that he takes Xanax for panic attacks that he would have daily without the medication.      Substance use history is significant for marijuana and alcohol. He reports that he drinks socially three times a week and will drink to excess once a month due to stress. He reports a history of Rehab once when he was 15 years old so that he could finish high  school early.      Patient currently denies symptoms of km or psychosis. Patient denies SI/HI/AVH.

## 2017-07-22 NOTE — ASSESSMENT & PLAN NOTE
Hold adderall at this time 2/2 addictive potential - can be judiciously reintroduced in future if need be.

## 2017-07-22 NOTE — PLAN OF CARE
07/22/17 1100   [facility] Group Therapy   Group Name Medication   Participation Level Active;Supportive;Appropriate   Participation Quality Cooperative   Insight/Motivation Good   Affect/Mood Display Appropriate   Cognition Alert
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
## 2017-07-22 NOTE — PROGRESS NOTES
07/22/17 0900 07/22/17 1000 07/22/17 1100   [facility] Group Therapy   Group Name Community Reintegration Therapeutic Recreation Medication   Specific Interventions Current Events (Pictionary) --    Participation Level Active;Supportive;Appropriate Active;Supportive;Appropriate;Attentive;Sharing Active;Supportive;Appropriate   Participation Quality Social;Cooperative Social;Cooperative Cooperative   Insight/Motivation Good Good Good   Affect/Mood Display Appropriate Bizarre;Bright Appropriate   Cognition Alert;Oriented Alert;Oriented Alert       07/22/17 1300   [facility] Group Therapy   Group Name Therapeutic Recreation   Specific Interventions (Bean Bag Toss)   Participation Level Active;Supportive;Appropriate   Participation Quality Cooperative;Social   Insight/Motivation Good   Affect/Mood Display Appropriate   Cognition Alert

## 2017-07-22 NOTE — PROGRESS NOTES
07/22/17 [address] [place] [address] Group Therapy   Group Name Therapeutic Recreation   Specific Interventions (Bean Bag Toss)   Participation Level Active;Supportive;Appropriate   Participation Quality Cooperative;Social   Insight/Motivation Good   Affect/Mood Display Appropriate   Cognition Alert

## 2017-07-23 PROCEDURE — 12400001 HC PSYCH SEMI-PRIVATE ROOM

## 2017-07-23 PROCEDURE — 25000003 PHARM REV CODE 250: Performed by: PSYCHIATRY & NEUROLOGY

## 2017-07-23 PROCEDURE — 99232 SBSQ HOSP IP/OBS MODERATE 35: CPT | Mod: AF,HB,S$PBB, | Performed by: PSYCHIATRY & NEUROLOGY

## 2017-07-23 RX ADMIN — RAMELTEON 8 MG: 8 TABLET, FILM COATED ORAL at 10:07

## 2017-07-23 RX ADMIN — BUPROPION HYDROCHLORIDE 150 MG: 150 TABLET, FILM COATED, EXTENDED RELEASE ORAL at 08:07

## 2017-07-23 RX ADMIN — NICOTINE 1 PATCH: 7 PATCH, EXTENDED RELEASE TRANSDERMAL at 08:07

## 2017-07-23 RX ADMIN — ESCITALOPRAM 20 MG: 20 TABLET, FILM COATED ORAL at 08:07

## 2017-07-23 RX ADMIN — HYDROXYZINE PAMOATE 50 MG: 50 CAPSULE ORAL at 07:07

## 2017-07-23 RX ADMIN — ATORVASTATIN CALCIUM 10 MG: 10 TABLET, FILM COATED ORAL at 08:07

## 2017-07-23 RX ADMIN — HYDROXYZINE PAMOATE 50 MG: 50 CAPSULE ORAL at 08:07

## 2017-07-23 NOTE — PROGRESS NOTES
07/23/17 0900 07/23/17 1100 07/23/17 1300   [facility] Group Therapy   Group Name Community Reintegration Medication Therapeutic Recreation   Specific Interventions Current Events --  Crafts   Participation Level None Active;Attentive;Appropriate Appropriate   Participation Quality Sleeping Cooperative Cooperative   Insight/Motivation --  Good Good   Affect/Mood Display --  Appropriate Appropriate   Cognition --  Alert Oriented;Alert

## 2017-07-23 NOTE — SUBJECTIVE & OBJECTIVE
"Interval History:   Patient is endorsing a "good" mood.     He is sleeping and eating well. He endorses night sweats since starting new regimen.    Denies SI.      Psychotherapeutics     Start     Stop Route Frequency Ordered    07/21/17 1230  escitalopram oxalate tablet 20 mg      -- Oral Daily 07/21/17 1122    07/21/17 1230  buPROPion TB24 tablet 150 mg      -- Oral Daily 07/21/17 1122    07/21/17 1222  ramelteon tablet 8 mg      -- Oral Nightly PRN 07/21/17 1122           Review of Systems  Objective:     Vital Signs (Most Recent):  Temp: 98.6 °F (37 °C) (07/23/17 0730)  Pulse: 65 (07/23/17 0730)  Resp: 16 (07/23/17 0730)  BP: (!) 152/80 (07/23/17 0730) Vital Signs (24h Range):  Temp:  [98.6 °F (37 °C)-98.8 °F (37.1 °C)] 98.6 °F (37 °C)  Pulse:  [59-65] 65  Resp:  [16] 16  BP: (152-161)/(80-88) 152/80     Height: 5' 4" (162.6 cm)  Weight: 74.7 kg (164 lb 10.9 oz)  Body mass index is 28.27 kg/m².    No intake or output data in the 24 hours ending 07/23/17 1134    Physical Exam   Psychiatric:   Mental Status Exam:  Appearance: unremarkable, age appropriate  Behavior/Cooperation: limited/ appropriate normal, cooperative  Speech: appropriate rate, volume and tone normal tone, normal rate, normal pitch, normal volume  Language: uses words appropriately; NO aphasia or dysarthria  Mood: "good"  Affect:  congruent with mood and appropriate to situation/content   Thought Process: normal and logical  Thought Content: normal, no suicidality, no homicidality, delusions, or paranoia  Level of Consciousness: Alert and Oriented x3  Memory:  Intact to conversation  Attention/concentration: appropriate for age/education.   Fund of Knowledge: appears adequate  Insight:  Intact  Judgment: Intact        Significant Labs:   Last 24 Hours:   Recent Lab Results     None          Significant Imaging: I have reviewed all pertinent imaging results/findings within the past 24 hours.  "

## 2017-07-23 NOTE — PLAN OF CARE
07/23/17 1100   [facility] Group Therapy   Group Name Medication   Participation Level Active;Attentive;Appropriate   Participation Quality Cooperative   Insight/Motivation Good   Affect/Mood Display Appropriate   Cognition Alert

## 2017-07-23 NOTE — PT/OT/SLP PROGRESS
Occupational Therapy  Not Seen    Matthew Austin  MRN: [id number]    Pt sleeping in room throughout morning on unit. OT to wake and encourage group participation for 10:00 group-Pt did not attend on this date.   Will cont to encourage participation and follow up at later time.    NICO Naranjo  7/23/2017
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
## 2017-07-23 NOTE — PROGRESS NOTES
[facility]  Psychiatry  Progress Note    Patient Name: Matthew Austin  MRN: [id number]   Code Status: No Order  Admission Date: 2017  Hospital Length of Stay: 3 days  Expected Discharge Date:   Attending Physician: Humberto Belle MD  Primary Care Provider: Primary Doctor No    Current Legal Status: [facility]    Patient information was obtained from patient.     Subjective:     Principal Problem:Depression    HPI: Matthew Austin is a 27 y.o. male with past psychiatric history of EDEN, OCD, Cluster B Personality traits with no specific diagnosis, and ADHD currently admitted with depression.     On my interview: Patient was seen in the dining area this evening. He reports that he came to the hospital due to a depressed mood and wanting to get his medications adjusted. He reports that he has been dealing with a depressed mood for the past year since his Father  however this has became increasingly worse over the past three months. He reports that over those past three months he has attempted suicide three times. He reports that his most recent suicide attempt was in  when he overdosed on 60 Xanax pills. He was hospitalized at [place] and discharged with Celexa 20 mg daily, Trazodone unknown dose, Vistaril unknown dose, Xanax 1 mg BID, and Adderall 60 mg daily.     He reports that he felt well a week after he was discharged from the hospital however he became increasingly depressed again after that. He endorses poor sleep, over eating due to stress, decreased concentration, guilt, hopelessness, and anhedonia. He was seen by his Therapist today and she recommended that he be admitted to the hospital.      Patient has a past psychiatric history of two hospitalizations. Once after becoming manic secondary to Chantix and another time after his recent suicide attempt. He has been on Celexa, Xanax, Trazodone, Adderall, and Vistaril in the past. He does not believe that the Celexa has been  "helpful and stopped taking the Trazodone due to bad dreams and teeth clinching. He reports that he takes Xanax for panic attacks that he would have daily without the medication.      Substance use history is significant for marijuana and alcohol. He reports that he drinks socially three times a week and will drink to excess once a month due to stress. He reports a history of Rehab once when he was 15 years old so that he could finish high school early.      Patient currently denies symptoms of km or psychosis. Patient denies SI/HI/AVH.    Hospital Course: 7/20: Admitted for depressed mood and wanting to get his medications adjusted, began cross taper from Celexa to Lexapro and initiated Wellbutrin.  7/22: tolerating regimen well, no changes made. Received Ramelteon PRN insomnia and Vistaril PRN anxiety x2.  7/23: Tolerating regimen well. Received Ramelteon PRN insomnia and Vistaril PRN anxiety x2. Endorsing being sleepy and sweaty throughout the night.    Interval History:   Patient is endorsing a "good" mood.     He is sleeping and eating well. He endorses night sweats since starting new regimen.    Denies SI.      Psychotherapeutics     Start     Stop Route Frequency Ordered    07/21/17 1230  escitalopram oxalate tablet 20 mg      -- Oral Daily 07/21/17 1122    07/21/17 1230  buPROPion TB24 tablet 150 mg      -- Oral Daily 07/21/17 1122    07/21/17 1222  ramelteon tablet 8 mg      -- Oral Nightly PRN 07/21/17 1122           Review of Systems  Objective:     Vital Signs (Most Recent):  Temp: 98.6 °F (37 °C) (07/23/17 0730)  Pulse: 65 (07/23/17 0730)  Resp: 16 (07/23/17 0730)  BP: (!) 152/80 (07/23/17 0730) Vital Signs (24h Range):  Temp:  [98.6 °F (37 °C)-98.8 °F (37.1 °C)] 98.6 °F (37 °C)  Pulse:  [59-65] 65  Resp:  [16] 16  BP: (152-161)/(80-88) 152/80     Height: 5' 4" (162.6 cm)  Weight: 74.7 kg (164 lb 10.9 oz)  Body mass index is 28.27 kg/m².    No intake or output data in the 24 hours ending 07/23/17 " "1134    Physical Exam   Psychiatric:   Mental Status Exam:  Appearance: unremarkable, age appropriate  Behavior/Cooperation: limited/ appropriate normal, cooperative  Speech: appropriate rate, volume and tone normal tone, normal rate, normal pitch, normal volume  Language: uses words appropriately; NO aphasia or dysarthria  Mood: "good"  Affect:  congruent with mood and appropriate to situation/content   Thought Process: normal and logical  Thought Content: normal, no suicidality, no homicidality, delusions, or paranoia  Level of Consciousness: Alert and Oriented x3  Memory:  Intact to conversation  Attention/concentration: appropriate for age/education.   Fund of Knowledge: appears adequate  Insight:  Intact  Judgment: Intact        Significant Labs:   Last 24 Hours:   Recent Lab Results     None          Significant Imaging: I have reviewed all pertinent imaging results/findings within the past 24 hours.    Assessment/Plan:     Attention deficit hyperactivity disorder (ADHD), predominantly inattentive type    Hold adderall at this time 2/2 addictive potential - can be judiciously reintroduced in future if need be.        EDEN (generalized anxiety disorder)    Continue crosstaper celexa to lexapro which should increase efficacy - this is to target EDEN, ?OCD vs OCPD, and depression.  Will begin trial of wellbutrin xl to target depression and smoking cessation.  Will hold xanax (has been off x1 week prior to admit) 2/2 addictive potential - can be judiciously reintroduced in future if need be.  Begin melatonin for sleep.  Cont vistaril as prn for breakthrough anxiety and insomnia.          * Depression    Continue crosstaper celexa to lexapro which should increase efficacy - this is to target EDEN, ?OCD vs OCPD, and depression.  Will begin trial of wellbutrin xl to target depression and smoking cessation.             Need for Continued Hospitalization:   Psychiatric illness continues to pose a potential threat to life " or bodily function, of self or others, thereby requiring the need for continued inpatient psychiatric hospitalization.    Anticipated Disposition: Home or Self Care    Tania Gama MD   Psychiatry  [facility]
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
## 2017-07-24 PROCEDURE — 25000003 PHARM REV CODE 250: Performed by: PSYCHIATRY & NEUROLOGY

## 2017-07-24 PROCEDURE — 99232 SBSQ HOSP IP/OBS MODERATE 35: CPT | Mod: AF,HB,, | Performed by: PSYCHIATRY & NEUROLOGY

## 2017-07-24 PROCEDURE — 12400001 HC PSYCH SEMI-PRIVATE ROOM

## 2017-07-24 RX ADMIN — HYDROXYZINE PAMOATE 50 MG: 50 CAPSULE ORAL at 09:07

## 2017-07-24 RX ADMIN — ESCITALOPRAM 20 MG: 20 TABLET, FILM COATED ORAL at 09:07

## 2017-07-24 RX ADMIN — RAMELTEON 8 MG: 8 TABLET, FILM COATED ORAL at 08:07

## 2017-07-24 RX ADMIN — ATORVASTATIN CALCIUM 10 MG: 10 TABLET, FILM COATED ORAL at 09:07

## 2017-07-24 RX ADMIN — HYDROXYZINE PAMOATE 50 MG: 50 CAPSULE ORAL at 03:07

## 2017-07-24 RX ADMIN — NICOTINE 1 PATCH: 7 PATCH, EXTENDED RELEASE TRANSDERMAL at 09:07

## 2017-07-24 RX ADMIN — BUPROPION HYDROCHLORIDE 150 MG: 150 TABLET, FILM COATED, EXTENDED RELEASE ORAL at 09:07

## 2017-07-24 NOTE — PROGRESS NOTES
07/23/17 2000   [facility] Group Therapy   Group Name Therapeutic Recreation   Specific Interventions Other (see comments)   Participation Level Active;Appropriate   Participation Quality Cooperative   Insight/Motivation Good   Affect/Mood Display Appropriate   Cognition Alert;Oriented

## 2017-07-24 NOTE — PROGRESS NOTES
[facility]  Psychiatry  Progress Note    Patient Name: Matthew Austin  MRN: [id number]   Code Status: No Order  Admission Date: 2017  Hospital Length of Stay: 4 days  Expected Discharge Date:   Attending Physician: Humberto Belle MD  Primary Care Provider: Primary Doctor No    Current Legal Status: [facility]    Patient information was obtained from patient and ER records.     Subjective:     Principal Problem:Depression    HPI: Matthew Austin is a 27 y.o. male with past psychiatric history of EDEN, OCD, Cluster B Personality traits with no specific diagnosis, and ADHD currently admitted with depression.     On my interview: Patient was seen in the dining area this evening. He reports that he came to the hospital due to a depressed mood and wanting to get his medications adjusted. He reports that he has been dealing with a depressed mood for the past year since his Father  however this has became increasingly worse over the past three months. He reports that over those past three months he has attempted suicide three times. He reports that his most recent suicide attempt was in  when he overdosed on 60 Xanax pills. He was hospitalized at [place] and discharged with Celexa 20 mg daily, Trazodone unknown dose, Vistaril unknown dose, Xanax 1 mg BID, and Adderall 60 mg daily.     He reports that he felt well a week after he was discharged from the hospital however he became increasingly depressed again after that. He endorses poor sleep, over eating due to stress, decreased concentration, guilt, hopelessness, and anhedonia. He was seen by his Therapist today and she recommended that he be admitted to the hospital.      Patient has a past psychiatric history of two hospitalizations. Once after becoming manic secondary to Chantix and another time after his recent suicide attempt. He has been on Celexa, Xanax, Trazodone, Adderall, and Vistaril in the past. He does not believe that the  "Celexa has been helpful and stopped taking the Trazodone due to bad dreams and teeth clinching. He reports that he takes Xanax for panic attacks that he would have daily without the medication.      Substance use history is significant for marijuana and alcohol. He reports that he drinks socially three times a week and will drink to excess once a month due to stress. He reports a history of Rehab once when he was 15 years old so that he could finish high school early.      Patient currently denies symptoms of km or psychosis. Patient denies SI/HI/AVH.    Hospital Course: 7/20: Admitted for depressed mood and wanting to get his medications adjusted, began cross taper from Celexa to Lexapro and initiated Wellbutrin.  7/22: tolerating regimen well, no changes made. Received Ramelteon PRN insomnia and Vistaril PRN anxiety x2.  7/23: Tolerating regimen well. Received Ramelteon PRN insomnia and Vistaril PRN anxiety x2. Endorsing being sleepy and sweaty throughout the night.    Interval History:     Has no complaints this morning. States he is tolerating the new medications. States his mood is "good" and not having any suicidal thoughts and that he had a good weekend. States that his primary problem now is anxiety which he says is "constant" and at a 5/10 in severity.    Endorses sleep disturbance, 45 minutes to fall asleep and wakes up frequently.     Discussed at length with patient and provided support and education regarding his separation from his fiance' which he states is a major source of anxiety in his life. He states that he primarily deals with this situation by avoiding the situation and avoiding thinking about it, discussing it. He states he does not think it is an effective strategy. He feels like he became very "co-dependent" with his partner especially during and after his father's passing.         Psychotherapeutics     Start     Stop Route Frequency Ordered    07/21/17 1230  escitalopram oxalate tablet " "20 mg      -- Oral Daily 07/21/17 1122    07/21/17 1230  buPROPion TB24 tablet 150 mg      -- Oral Daily 07/21/17 1122    07/21/17 1222  ramelteon tablet 8 mg      -- Oral Nightly PRN 07/21/17 1122           Review of Systems     - abdominal pain, nausea, vomiting, diarrhea      Objective:     Vital Signs (Most Recent):  Temp: 98.6 °F (37 °C) (07/24/17 0800)  Pulse: 83 (07/24/17 0800)  Resp: 17 (07/24/17 0800)  BP: (!) 152/79 (07/24/17 0800) Vital Signs (24h Range):  Temp:  [98 °F (36.7 °C)-98.6 °F (37 °C)] 98.6 °F (37 °C)  Pulse:  [73-83] 83  Resp:  [16-18] 17  BP: (152-166)/(79-92) 152/79     Height: 5' 4" (162.6 cm)  Weight: 74.7 kg (164 lb 10.9 oz)  Body mass index is 28.27 kg/m².    No intake or output data in the 24 hours ending 07/24/17 0922    Physical Exam     CONSTITUTIONAL  General Appearance: casually dressed, NAD, calm, cooperative    MUSCULOSKELETAL  Muscle Strength and Tone: no weakness, or spasticity  Abnormal Involuntary Movements: no dyskinesia, dystonia  Gait and Station: ambulating without difficutly    PSYCHIATRIC   Level of Consciousness: alert  Orientation: oriented to person, place, situation  Grooming: fair  Psychomotor Behavior: no agitation, retardation  Speech: normal rate, volume, tone  Language: English, no asphasia  Mood: "anxious"  Affect: full range, appropriate  Thought Process: linear, logical  Associations: cohesive, not loose  Thought Content: denies SI  Memory: intact to recent and remote events  Attention: not distracted  Fund of Knowledge: appropriate for education level  Insight: fair  Judgment: limited       Significant Labs:   Last 24 Hours:   Recent Lab Results     None          Significant Imaging: I have reviewed all pertinent imaging results/findings within the past 24 hours.    Assessment/Plan:     Attention deficit hyperactivity disorder (ADHD), predominantly inattentive type    Hold adderall at this time 2/2 addictive potential - can be reintroduced in future if need " be.        Tobacco use disorder    - Counseled on cessation  - Nicotine patches        EDEN (generalized anxiety disorder)    - Continue lexapro  - Continuing to hold home dose of Xanax due to addictive potential and patient's substance abuse  - Continue Wellbutrin        * Depression    - Continue lexapro for depressed mood and anxiety  - Continue Wellbutrin for mood, attention             Need for Continued Hospitalization:   Requires ongoing hospitalization for stabilization of medications.    Anticipated Disposition: Home or Self Care    Carlitos Mary MD   Psychiatry  [facility]

## 2017-07-24 NOTE — PROGRESS NOTES
07/24/17 0900 07/24/17 1000 07/24/17 1100   [facility] Group Therapy   Group Name Community Reintegration Therapeutic Recreation Therapeutic Recreation   Specific Interventions Current Events (tri-bond) Relaxation Training   Participation Level Active;Supportive;Appropriate Active;Supportive;Appropriate Supportive;Active;Appropriate   Participation Quality Cooperative;Social Cooperative;Social Cooperative   Insight/Motivation Good Good Good   Affect/Mood Display Appropriate Appropriate Appropriate   Cognition Alert;Oriented Alert;Oriented Alert;Oriented       07/24/17 1300   [facility] Group Therapy   Group Name Therapeutic Recreation   Specific Interventions Crafts   Participation Level Active;Appropriate;Attentive   Participation Quality Cooperative;Social   Insight/Motivation Good   Affect/Mood Display Appropriate   Cognition Alert;Oriented
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
## 2017-07-24 NOTE — ASSESSMENT & PLAN NOTE
- Continue lexapro  - Continuing to hold home dose of Xanax due to addictive potential and patient's substance abuse  - Continue Wellbutrin
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
## 2017-07-24 NOTE — SUBJECTIVE & OBJECTIVE
"Interval History:     Has no complaints this morning. States he is tolerating the new medications. States his mood is "good" and not having any suicidal thoughts and that he had a good weekend. States that his primary problem now is anxiety which he says is "constant" and at a 5/10 in severity.    Endorses sleep disturbance, 45 minutes to fall asleep and wakes up frequently.     Discussed at length with patient and provided support and education regarding his separation from his fiance' which he states is a major source of anxiety in his life. He states that he primarily deals with this situation by avoiding the situation and avoiding thinking about it, discussing it. He states he does not think it is an effective strategy. He feels like he became very "co-dependent" with his partner especially during and after his father's passing.         Psychotherapeutics     Start     Stop Route Frequency Ordered    07/21/17 1230  escitalopram oxalate tablet 20 mg      -- Oral Daily 07/21/17 1122    07/21/17 1230  buPROPion TB24 tablet 150 mg      -- Oral Daily 07/21/17 1122    07/21/17 1222  ramelteon tablet 8 mg      -- Oral Nightly PRN 07/21/17 1122           Review of Systems     - abdominal pain, nausea, vomiting, diarrhea      Objective:     Vital Signs (Most Recent):  Temp: 98.6 °F (37 °C) (07/24/17 0800)  Pulse: 83 (07/24/17 0800)  Resp: 17 (07/24/17 0800)  BP: (!) 152/79 (07/24/17 0800) Vital Signs (24h Range):  Temp:  [98 °F (36.7 °C)-98.6 °F (37 °C)] 98.6 °F (37 °C)  Pulse:  [73-83] 83  Resp:  [16-18] 17  BP: (152-166)/(79-92) 152/79     Height: 5' 4" (162.6 cm)  Weight: 74.7 kg (164 lb 10.9 oz)  Body mass index is 28.27 kg/m².    No intake or output data in the 24 hours ending 07/24/17 0922    Physical Exam     CONSTITUTIONAL  General Appearance: casually dressed, NAD, calm, cooperative    MUSCULOSKELETAL  Muscle Strength and Tone: no weakness, or spasticity  Abnormal Involuntary Movements: no dyskinesia, " "dystonia  Gait and Station: ambulating without difficutly    PSYCHIATRIC   Level of Consciousness: alert  Orientation: oriented to person, place, situation  Grooming: fair  Psychomotor Behavior: no agitation, retardation  Speech: normal rate, volume, tone  Language: English, no asphasia  Mood: "anxious"  Affect: full range, appropriate  Thought Process: linear, logical  Associations: cohesive, not loose  Thought Content: denies SI  Memory: intact to recent and remote events  Attention: not distracted  Fund of Knowledge: appropriate for education level  Insight: fair  Judgment: limited       Significant Labs:   Last 24 Hours:   Recent Lab Results     None          Significant Imaging: I have reviewed all pertinent imaging results/findings within the past 24 hours.  "

## 2017-07-25 PROCEDURE — 99232 SBSQ HOSP IP/OBS MODERATE 35: CPT | Mod: AF,HB,, | Performed by: PSYCHIATRY & NEUROLOGY

## 2017-07-25 PROCEDURE — S4991 NICOTINE PATCH NONLEGEND: HCPCS | Performed by: PSYCHIATRY & NEUROLOGY

## 2017-07-25 PROCEDURE — 12400001 HC PSYCH SEMI-PRIVATE ROOM

## 2017-07-25 PROCEDURE — 25000003 PHARM REV CODE 250: Performed by: PSYCHIATRY & NEUROLOGY

## 2017-07-25 PROCEDURE — 97150 GROUP THERAPEUTIC PROCEDURES: CPT

## 2017-07-25 RX ADMIN — NICOTINE 1 PATCH: 7 PATCH, EXTENDED RELEASE TRANSDERMAL at 09:07

## 2017-07-25 RX ADMIN — ATORVASTATIN CALCIUM 10 MG: 10 TABLET, FILM COATED ORAL at 09:07

## 2017-07-25 RX ADMIN — RAMELTEON 8 MG: 8 TABLET, FILM COATED ORAL at 08:07

## 2017-07-25 RX ADMIN — HYDROXYZINE PAMOATE 50 MG: 50 CAPSULE ORAL at 08:07

## 2017-07-25 RX ADMIN — HYDROXYZINE PAMOATE 50 MG: 50 CAPSULE ORAL at 11:07

## 2017-07-25 RX ADMIN — ESCITALOPRAM 20 MG: 20 TABLET, FILM COATED ORAL at 09:07

## 2017-07-25 RX ADMIN — BUPROPION HYDROCHLORIDE 150 MG: 150 TABLET, FILM COATED, EXTENDED RELEASE ORAL at 09:07

## 2017-07-25 NOTE — SUBJECTIVE & OBJECTIVE
"Interval History:     Endorses good sleep last night and a good mood today. Denies depressed mood and Si, Hi. States that his anxiety is increasing today about 7/10. States he is using coping skills that he learned including deep breathing.      Psychotherapeutics     Start     Stop Route Frequency Ordered    07/21/17 1230  escitalopram oxalate tablet 20 mg      -- Oral Daily 07/21/17 1122    07/21/17 1230  buPROPion TB24 tablet 150 mg      -- Oral Daily 07/21/17 1122    07/21/17 1222  ramelteon tablet 8 mg      -- Oral Nightly PRN 07/21/17 1122           Review of Systems   Negative except as above.    Objective:     Vital Signs (Most Recent):  Temp: 98.3 °F (36.8 °C) (07/25/17 0800)  Pulse: 75 (07/25/17 0800)  Resp: 18 (07/25/17 0800)  BP: (!) 161/81 (07/25/17 0800) Vital Signs (24h Range):  Temp:  [98.3 °F (36.8 °C)-98.9 °F (37.2 °C)] 98.3 °F (36.8 °C)  Pulse:  [66-75] 75  Resp:  [16-18] 18  BP: (161-163)/(81-98) 161/81     Height: 5' 4" (162.6 cm)  Weight: 74.7 kg (164 lb 10.9 oz)  Body mass index is 28.27 kg/m².    No intake or output data in the 24 hours ending 07/25/17 0910    Physical Exam     CONSTITUTIONAL  General Appearance: in casual dress, NAD, calm, cooperative    MUSCULOSKELETAL  Muscle Strength and Tone: no weakness, or spasticity  Abnormal Involuntary Movements: no dyskinesia, dystonia  Gait and Station: ambulating without difficutly    PSYCHIATRIC   Level of Consciousness: alert  Orientation: oriented to person, place, situation  Grooming: fair  Psychomotor Behavior: no agitation, retardation  Speech: normal rate, volume, tone  Language: English, no asphasia  Mood: "good"  Affect: smiling, laughing appropriately  Thought Process: linear, logical  Associations: cohesive  Thought Content: denies SI  Memory: intact to recent and remote events  Attention: not distracted  Fund of Knowledge: appropriate for education level  Insight: fair  Judgment: limited       Significant Labs:   Last 24 Hours: "   Recent Lab Results     None          Significant Imaging: I have reviewed all pertinent imaging results/findings within the past 24 hours.

## 2017-07-25 NOTE — PROGRESS NOTES
sw called Dr. French's office and left voicemail.     megan also called pt's mother to set up family meeting earlier today. Left voicemail.

## 2017-07-25 NOTE — PROGRESS NOTES
07/25/17 0900 07/25/17 1100 07/25/17 1300   [facility] Group Therapy   Group Name Community Reintegration Education Therapeutic Recreation   Specific Interventions Current Events Relaxation Training (basketball toss)   Participation Level Active;Appropriate;Attentive Active;Appropriate;Attentive Active;Appropriate;Attentive   Participation Quality Cooperative;Social Cooperative;Social Cooperative;Social   Insight/Motivation Good Good Good   Affect/Mood Display Appropriate Appropriate Appropriate   Cognition Alert;Oriented Alert;Oriented Alert;Oriented
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
## 2017-07-25 NOTE — PROGRESS NOTES
07/24/17 2000   [facility] Group Therapy   Group Name Community Reintegration   Specific Interventions Other (see comments)  (check in group)   Participation Level Active   Participation Quality Cooperative   Insight/Motivation Good   Affect/Mood Display Appropriate   Cognition Alert

## 2017-07-25 NOTE — MEDICAL/APP STUDENT
Psychiatric Progress Note           7/25/2017    Date of Admission: 7/20/2017  6:26 PM  Length of Stay: 5 days  Current Legal Status: FVA    Subjective: Matthew Austin is a 26yo male with past history of suicide attempt x3, Cluster B personality traits, ADHD, and anxiety admitted for depression and SI.     Interval history: Patient states he feels good today and endorses a good mood. He denies depression, SI, HI. He states his anxiety is up to a 7-8/10, higher than his baseline of 5/10. He slept well. Patient is open to the idea of an outpatient program.     One on one session with patient involved counseling about mindfulness and identifying the root of anxiety and fears in the moments he feels anxious. We discussed exploring his feelings when he is in an anxiety-producing situation and trying to consider realistic outcomes to drive responses.     Non-Psychiatric Review of Systems - negative   Psychiatric Review Of Systems - Is patient having or experiencing an increase in:  sleep: no  appetite changes: no  energy/anergy: no  anxiety/panic: yes  guilty/hopeless: no    Objective:  Vitals:    07/25/17 0800   BP: (!) 161/81   Pulse: 75   Resp: 18   Temp: 98.3 °F (36.8 °C)     Review of patient's allergies indicates:  No Known Allergies  Psychotherapeutics     Start     Stop Route Frequency Ordered    07/21/17 1230  escitalopram oxalate tablet 20 mg      -- Oral Daily 07/21/17 1122    07/21/17 1230  buPROPion TB24 tablet 150 mg      -- Oral Daily 07/21/17 1122    07/21/17 1222  ramelteon tablet 8 mg      -- Oral Nightly PRN 07/21/17 1122        · Complete Medication List:   atorvastatin  10 mg Oral Daily    buPROPion  150 mg Oral Daily    escitalopram oxalate  20 mg Oral Daily    nicotine  1 patch Transdermal Daily     acetaminophen, docusate sodium, hydrOXYzine pamoate, ramelteon  · Laboratories:   No new labs in past 24 hours  · General/Constitutional Exam:   Appearance: unremarkable, age appropriate, neatly  groomed, overweight  · Musculoskeletal Exam:  Abnormal Involuntary Movements: None appreciated.  Gait: Did not ask patient to ambulate.  · Mental Status Exam:  Orientation: grossly intact  Behavior/Cooperation: normal, friendly and cooperative, restless and fidgety   Language: appropriate   Speech: normal tone, normal rate, normal pitch, normal volume  Mood/Affect: good, anxious,   Normal  Abnormal/Psychotic thoughts: normal, no suicidality, no homicidality, delusions, or paranoia  Thought process: normal and logical  Associations: normal and logical  Fund of knowledge: intact and appropriate to age and level of education   Memory (recent and remote): > able to remember recent events- yes, able to remember remote events- yes  Attention/Concentration: Intact to conversation  Insight/Judgment: fair,  fair    Assessment:  Matthew Austin is a 27 y.o. male with  EDEN, ADHD, hx of suicide attempt admitted for depression who is currently stable.    Plan:     Depression  - Escitalopram 20mg   - Trial of buprioprion 150mg tolerated   - denies depressed mood today      Generalized Anxiety Disorder   - Escitalopram 20mg   - Home xanax - continue hold   - Endorses strong feelings of anxiety today     ADHD  - Home adderall - continue hold      HTN  - Blood pressure most recently 161/81   - Elevated BP could also be related to xanax hold, monitor for next day (continues to be elevated on first day of monitoring)  - Pt has previously been told he has elevated blood pressure and was referred to cardiology; f/u outpatient      Dyslipidemia  - Cholesterol 282, HDL 38, , TGLS 337  - Atorvastatin 10mg   - Consider increasing dose of atorvastatin or adding fibrate/additional medication        Sheyla Salazar MS3
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
## 2017-07-25 NOTE — PROGRESS NOTES
[facility]  Psychiatry  Progress Note    Patient Name: Matthew Austin  MRN: [id number]   Code Status: No Order  Admission Date: 2017  Hospital Length of Stay: 5 days  Expected Discharge Date:   Attending Physician: Humberto Belle MD  Primary Care Provider: Primary Doctor No    Current Legal Status: FVA    Patient information was obtained from patient and ER records.     Subjective:     Principal Problem:Depression    HPI: Matthew Austin is a 27 y.o. male with past psychiatric history of EDEN, OCD, Cluster B Personality traits with no specific diagnosis, and ADHD currently admitted with depression.     On my interview: Patient was seen in the dining area this evening. He reports that he came to the hospital due to a depressed mood and wanting to get his medications adjusted. He reports that he has been dealing with a depressed mood for the past year since his Father  however this has became increasingly worse over the past three months. He reports that over those past three months he has attempted suicide three times. He reports that his most recent suicide attempt was in  when he overdosed on 60 Xanax pills. He was hospitalized at [place] and discharged with Celexa 20 mg daily, Trazodone unknown dose, Vistaril unknown dose, Xanax 1 mg BID, and Adderall 60 mg daily.     He reports that he felt well a week after he was discharged from the hospital however he became increasingly depressed again after that. He endorses poor sleep, over eating due to stress, decreased concentration, guilt, hopelessness, and anhedonia. He was seen by his Therapist today and she recommended that he be admitted to the hospital.      Patient has a past psychiatric history of two hospitalizations. Once after becoming manic secondary to Chantix and another time after his recent suicide attempt. He has been on Celexa, Xanax, Trazodone, Adderall, and Vistaril in the past. He does not believe that the  "Celexa has been helpful and stopped taking the Trazodone due to bad dreams and teeth clinching. He reports that he takes Xanax for panic attacks that he would have daily without the medication.      Substance use history is significant for marijuana and alcohol. He reports that he drinks socially three times a week and will drink to excess once a month due to stress. He reports a history of Rehab once when he was 15 years old so that he could finish high school early.      Patient currently denies symptoms of km or psychosis. Patient denies SI/HI/AVH.    Hospital Course: 7/20: Admitted for depressed mood and wanting to get his medications adjusted, began cross taper from Celexa to Lexapro and initiated Wellbutrin.  7/22: tolerating regimen well, no changes made. Received Ramelteon PRN insomnia and Vistaril PRN anxiety x2.  7/23: Tolerating regimen well. Received Ramelteon PRN insomnia and Vistaril PRN anxiety x2. Endorsing being sleepy and sweaty throughout the night.    Interval History:     Endorses good sleep last night and a good mood today. Denies depressed mood and Si, Hi. States that his anxiety is increasing today about 7/10. States he is using coping skills that he learned including deep breathing.      Psychotherapeutics     Start     Stop Route Frequency Ordered    07/21/17 1230  escitalopram oxalate tablet 20 mg      -- Oral Daily 07/21/17 1122    07/21/17 1230  buPROPion TB24 tablet 150 mg      -- Oral Daily 07/21/17 1122    07/21/17 1222  ramelteon tablet 8 mg      -- Oral Nightly PRN 07/21/17 1122           Review of Systems   Negative except as above.    Objective:     Vital Signs (Most Recent):  Temp: 98.3 °F (36.8 °C) (07/25/17 0800)  Pulse: 75 (07/25/17 0800)  Resp: 18 (07/25/17 0800)  BP: (!) 161/81 (07/25/17 0800) Vital Signs (24h Range):  Temp:  [98.3 °F (36.8 °C)-98.9 °F (37.2 °C)] 98.3 °F (36.8 °C)  Pulse:  [66-75] 75  Resp:  [16-18] 18  BP: (161-163)/(81-98) 161/81     Height: 5' 4" " "(162.6 cm)  Weight: 74.7 kg (164 lb 10.9 oz)  Body mass index is 28.27 kg/m².    No intake or output data in the 24 hours ending 07/25/17 0910    Physical Exam     CONSTITUTIONAL  General Appearance: in casual dress, NAD, calm, cooperative    MUSCULOSKELETAL  Muscle Strength and Tone: no weakness, or spasticity  Abnormal Involuntary Movements: no dyskinesia, dystonia  Gait and Station: ambulating without difficutly    PSYCHIATRIC   Level of Consciousness: alert  Orientation: oriented to person, place, situation  Grooming: fair  Psychomotor Behavior: no agitation, retardation  Speech: normal rate, volume, tone  Language: English, no asphasia  Mood: "good"  Affect: smiling, laughing appropriately  Thought Process: linear, logical  Associations: cohesive  Thought Content: denies SI  Memory: intact to recent and remote events  Attention: not distracted  Fund of Knowledge: appropriate for education level  Insight: fair  Judgment: limited       Significant Labs:   Last 24 Hours:   Recent Lab Results     None          Significant Imaging: I have reviewed all pertinent imaging results/findings within the past 24 hours.    Assessment/Plan:     Attention deficit hyperactivity disorder (ADHD), predominantly inattentive type    Hold adderall at this time 2/2 addictive potential - can be reintroduced in future if need be.        Tobacco use disorder    - Counseled on cessation  - Nicotine patches        EDEN (generalized anxiety disorder)    - Continue lexapro  - Continuing to hold home dose of Xanax due to addictive potential and patient's substance abuse  - Continue Wellbutrin        * Depression    - Continue lexapro for depressed mood and anxiety  - Continue Wellbutrin for mood, attention             Need for Continued Hospitalization:   Requires ongoing hospitalization for stabilization of medications.    Anticipated Disposition: Home or Self Care    Carlitos Mary MD   Psychiatry  [facility]  "
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
## 2017-07-25 NOTE — PLAN OF CARE
07/25/17 1230   [facility] Group Therapy   Group Name Medication   Participation Level Active;Supportive;Appropriate;Attentive   Participation Quality Cooperative   Insight/Motivation Good   Affect/Mood Display Appropriate   Cognition Alert

## 2017-07-26 VITALS
HEART RATE: 81 BPM | WEIGHT: 164.69 LBS | HEIGHT: 64 IN | TEMPERATURE: 98 F | BODY MASS INDEX: 28.12 KG/M2 | SYSTOLIC BLOOD PRESSURE: 170 MMHG | RESPIRATION RATE: 17 BRPM | DIASTOLIC BLOOD PRESSURE: 81 MMHG

## 2017-07-26 PROCEDURE — 25000003 PHARM REV CODE 250: Performed by: PSYCHIATRY & NEUROLOGY

## 2017-07-26 PROCEDURE — 99239 HOSP IP/OBS DSCHRG MGMT >30: CPT | Mod: AF,HB,S$PBB, | Performed by: PSYCHIATRY & NEUROLOGY

## 2017-07-26 RX ORDER — NICOTINE 7MG/24HR
1 PATCH, TRANSDERMAL 24 HOURS TRANSDERMAL DAILY
Qty: 30 PATCH | Refills: 0 | COMMUNITY
Start: 2017-07-26 | End: 2023-07-15

## 2017-07-26 RX ORDER — RAMELTEON 8 MG/1
8 TABLET ORAL NIGHTLY PRN
Qty: 30 TABLET | Refills: 3 | Status: SHIPPED | OUTPATIENT
Start: 2017-07-26 | End: 2023-07-15

## 2017-07-26 RX ORDER — ESCITALOPRAM OXALATE 20 MG/1
20 TABLET ORAL DAILY
Qty: 30 TABLET | Refills: 2 | Status: SHIPPED | OUTPATIENT
Start: 2017-07-26 | End: 2021-02-18 | Stop reason: ALTCHOICE

## 2017-07-26 RX ORDER — BUPROPION HYDROCHLORIDE 150 MG/1
150 TABLET ORAL DAILY
Qty: 30 TABLET | Refills: 2 | Status: SHIPPED | OUTPATIENT
Start: 2017-07-26 | End: 2021-01-21

## 2017-07-26 RX ADMIN — ATORVASTATIN CALCIUM 10 MG: 10 TABLET, FILM COATED ORAL at 08:07

## 2017-07-26 RX ADMIN — ESCITALOPRAM 20 MG: 20 TABLET, FILM COATED ORAL at 08:07

## 2017-07-26 RX ADMIN — HYDROXYZINE PAMOATE 50 MG: 50 CAPSULE ORAL at 09:07

## 2017-07-26 RX ADMIN — BUPROPION HYDROCHLORIDE 150 MG: 150 TABLET, FILM COATED, EXTENDED RELEASE ORAL at 08:07
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
## 2017-07-26 NOTE — DISCHARGE SUMMARY
[facility]  Psychiatry  Discharge Summary      Patient Name: Matthew Austin  MRN: [id number]  Admission Date: 2017  Hospital Length of Stay: 6 days  Discharge Date and Time:  2017 10:48 AM  Attending Physician: Humberto Belle MD   Discharging Provider: Carlitos Mary MD  Primary Care Provider: Primary Doctor No    HPI:   Matthew Austin is a 27 y.o. male with past psychiatric history of EDEN, OCD, Cluster B Personality traits with no specific diagnosis, and ADHD currently admitted with depression.     On my interview: Patient was seen in the dining area this evening. He reports that he came to the hospital due to a depressed mood and wanting to get his medications adjusted. He reports that he has been dealing with a depressed mood for the past year since his Father  however this has became increasingly worse over the past three months. He reports that over those past three months he has attempted suicide three times. He reports that his most recent suicide attempt was in  when he overdosed on 60 Xanax pills. He was hospitalized at [place] and discharged with Celexa 20 mg daily, Trazodone unknown dose, Vistaril unknown dose, Xanax 1 mg BID, and Adderall 60 mg daily.     He reports that he felt well a week after he was discharged from the hospital however he became increasingly depressed again after that. He endorses poor sleep, over eating due to stress, decreased concentration, guilt, hopelessness, and anhedonia. He was seen by his Therapist today and she recommended that he be admitted to the hospital.      Patient has a past psychiatric history of two hospitalizations. Once after becoming manic secondary to Chantix and another time after his recent suicide attempt. He has been on Celexa, Xanax, Trazodone, Adderall, and Vistaril in the past. He does not believe that the Celexa has been helpful and stopped taking the Trazodone due to bad dreams and teeth  clinching. He reports that he takes Xanax for panic attacks that he would have daily without the medication.      Substance use history is significant for marijuana and alcohol. He reports that he drinks socially three times a week and will drink to excess once a month due to stress. He reports a history of Rehab once when he was 15 years old so that he could finish high school early.      Patient currently denies symptoms of km or psychosis. Patient denies SI/HI/AVH.    Hospital Course:   7/20: Admitted for depressed mood and wanting to get his medications adjusted, began cross taper from Celexa to Lexapro and initiated Wellbutrin.  7/22: tolerating regimen well, no changes made. Received Ramelteon PRN insomnia and Vistaril PRN anxiety x2.  7/23: Tolerating regimen well. Received Ramelteon PRN insomnia and Vistaril PRN anxiety x2. Endorsing being sleepy and sweaty throughout the night.  7/26/17: Patient reports that he feels better and that his medications are helping him. He endorses good sleep and appetite and denies side effects to the medications. He is attending to his ADLs and has shown calm and cooperative behavior on the unit. He exhibits a linear, logical thought process. Denies suicidal thoughts, homicidal thoughts and reports a good mood and has a congruent affect.         Consults: none    Significant Labs:   Last 24 Hours:   Recent Lab Results     None          Significant Imaging: I have reviewed all pertinent imaging results/findings within the past 24 hours.    Smoking Cessation:   Does the patient smoke? Yes  Does the patient want a prescription for Smoking Cessation? Yes  Does the patient want counseling for Smoking Cessation? Yes         Pending Diagnostic Studies:     None        Final Active Diagnoses:    Diagnosis Date Noted POA    PRINCIPAL PROBLEM:  Depression [F32.9] 07/20/2017 Yes    EDEN (generalized anxiety disorder) [F41.1] 07/21/2017 Yes     Chronic    Tobacco use disorder  [F17.200] 07/21/2017 Yes     Chronic    Attention deficit hyperactivity disorder (ADHD), predominantly inattentive type [F90.0] 07/21/2017 Yes     Chronic      Problems Resolved During this Admission:    Diagnosis Date Noted Date Resolved POA      No new Assessment & Plan notes have been filed under this hospital service since the last note was generated.  Service: Psychiatry      Discharged Condition: stable    Disposition: Home or Self Care    Follow Up:  Follow-up Information     Go to [facility] SERVICES.    Why:  AFTER CARE APPOINTMENT FOR INTAKE TO BE EVALUATED FOR INTENSIVE OUTPATIENT PROGRAM ON TUESDAY AUGUST 1 AT 1:30 pm.  Contact information:  [facility] CARE SERVICES  INTENSIVE OUTPATIENT PROGRAM  [address] [place] [id number]  [phone]               Patient Instructions:     Diet general     Activity as tolerated     Call MD for:   Scheduling Instructions: Suicidal ideations, homicidal ideations, or changes in mental status or behavior.       Medications:  Reconciled Home Medications:   Current Discharge Medication List      START taking these medications    Details   buPROPion (WELLBUTRIN XL) 150 MG TB24 tablet Take 1 tablet (150 mg total) by mouth once daily.  Qty: 30 tablet, Refills: 2      escitalopram oxalate (LEXAPRO) 20 MG tablet Take 1 tablet (20 mg total) by mouth once daily.  Qty: 30 tablet, Refills: 2      nicotine (NICODERM CQ) 7 mg/24 hr Place 1 patch onto the skin once daily.  Qty: 30 patch, Refills: 0      ramelteon (ROZEREM) 8 mg tablet Take 1 tablet (8 mg total) by mouth nightly as needed for Insomnia.  Qty: 30 tablet, Refills: 3         CONTINUE these medications which have NOT CHANGED    Details   atorvastatin (LIPITOR) 10 MG tablet Take 10 mg by mouth once daily.         STOP taking these medications       alprazolam (XANAX) 1 MG tablet Comments:   Reason for Stopping:             Is patient being discharged on multiple neuroleptics? No    Time spent on the  discharge of patient: 35 minutes    All elements of the transition record were discussed with the patient at discharge and patient agrees to this plan.    Carlitos Mary MD  Psychiatry  [facility]

## 2017-07-26 NOTE — PROGRESS NOTES
07/26/17 0900 07/26/17 1000 07/26/17 1100   [facility] Group Therapy   Group Name Community Reintegration Mental Awareness Education   Specific Interventions Current Events Cognitive Stimulation Training Relaxation Training   Participation Level Active;Appropriate;Attentive Active;Appropriate;Attentive Active;Appropriate;Attentive   Participation Quality Cooperative;Social Cooperative;Social Cooperative;Social   Insight/Motivation Good Good Good   Affect/Mood Display Appropriate Appropriate Appropriate   Cognition Alert;Oriented Alert;Oriented Alert;Oriented

## 2017-07-26 NOTE — PROGRESS NOTES
[facility]  Psychiatry  Progress Note    Patient Name: Matthew Austin  MRN: [id number]   Code Status: No Order  Admission Date: 2017  Hospital Length of Stay: 6 days  Expected Discharge Date:   Attending Physician: Humberto Belle MD  Primary Care Provider: Primary Doctor No    Current Legal Status: FVA    Patient information was obtained from patient and ER records.     Subjective:     Principal Problem:Depression    HPI: Matthew Austin is a 27 y.o. male with past psychiatric history of EDEN, OCD, Cluster B Personality traits with no specific diagnosis, and ADHD currently admitted with depression.     On my interview: Patient was seen in the dining area this evening. He reports that he came to the hospital due to a depressed mood and wanting to get his medications adjusted. He reports that he has been dealing with a depressed mood for the past year since his Father  however this has became increasingly worse over the past three months. He reports that over those past three months he has attempted suicide three times. He reports that his most recent suicide attempt was in  when he overdosed on 60 Xanax pills. He was hospitalized at [facility] and discharged with Celexa 20 mg daily, Trazodone unknown dose, Vistaril unknown dose, Xanax 1 mg BID, and Adderall 60 mg daily.     He reports that he felt well a week after he was discharged from the hospital however he became increasingly depressed again after that. He endorses poor sleep, over eating due to stress, decreased concentration, guilt, hopelessness, and anhedonia. He was seen by his Therapist today and she recommended that he be admitted to the hospital.      Patient has a past psychiatric history of two hospitalizations. Once after becoming manic secondary to Chantix and another time after his recent suicide attempt. He has been on Celexa, Xanax, Trazodone, Adderall, and Vistaril in the past. He does not believe that the  "Celexa has been helpful and stopped taking the Trazodone due to bad dreams and teeth clinching. He reports that he takes Xanax for panic attacks that he would have daily without the medication.      Substance use history is significant for marijuana and alcohol. He reports that he drinks socially three times a week and will drink to excess once a month due to stress. He reports a history of Rehab once when he was 15 years old so that he could finish high school early.      Patient currently denies symptoms of km or psychosis. Patient denies SI/HI/AVH.    Hospital Course: 7/20: Admitted for depressed mood and wanting to get his medications adjusted, began cross taper from Celexa to Lexapro and initiated Wellbutrin.  7/22: tolerating regimen well, no changes made. Received Ramelteon PRN insomnia and Vistaril PRN anxiety x2.  7/23: Tolerating regimen well. Received Ramelteon PRN insomnia and Vistaril PRN anxiety x2. Endorsing being sleepy and sweaty throughout the night.    Interval History:     Family Meeting:  Met with patient and patient's Mother today in treatment team for family meeting. The patient states that he feels ready to go to a lower level of care like an IOP. The patient's mother is agreeable to this and wants for him to continue improving and not to have suicidal thoughts. The mother appears to to be very supportive but states she would like for Matthew to start attending to his own fiances and life without her assistance so much. The mother reports the patient frequently has "meltdowns" and "crisises" when things in his life go wrong and she has made the primary interventions to correct these situations without the patient making any changes himself. She reiterates that she would like Matthew to get to a point to where he can care for himself but does say that she believes his "mental problems" prevent him from achieving these goals.    Provide the patient and his mother with medication education and " "current regime. Discuss options with them regarding next level of care after discharge.        Psychotherapeutics     Start     Stop Route Frequency Ordered    07/21/17 1230  escitalopram oxalate tablet 20 mg      -- Oral Daily 07/21/17 1122    07/21/17 1230  buPROPion TB24 tablet 150 mg      -- Oral Daily 07/21/17 1122    07/21/17 1222  ramelteon tablet 8 mg      -- Oral Nightly PRN 07/21/17 1122           Review of Systems     All systems reviewed negative.    Objective:     Vital Signs (Most Recent):  Temp: 97.7 °F (36.5 °C) (07/26/17 0800)  Pulse: 77 (07/26/17 0800)  Resp: 17 (07/26/17 0800)  BP: (!) 159/95 (07/26/17 0800) Vital Signs (24h Range):  Temp:  [97.7 °F (36.5 °C)-98 °F (36.7 °C)] 97.7 °F (36.5 °C)  Pulse:  [68-77] 77  Resp:  [17-18] 17  BP: (159-178)/() 159/95     Height: 5' 4" (162.6 cm)  Weight: 74.7 kg (164 lb 10.9 oz)  Body mass index is 28.27 kg/m².    No intake or output data in the 24 hours ending 07/26/17 1004    Physical Exam  CONSTITUTIONAL  General Appearance: casual dress, NAD, calm, cooperative    MUSCULOSKELETAL  Muscle Strength and Tone: no weakness, or spasticity  Abnormal Involuntary Movements: no dyskinesia, dystonia  Gait and Station: ambulating without difficutly    PSYCHIATRIC   Level of Consciousness: alert  Orientation: oriented to person, place, situation  Grooming: fair  Psychomotor Behavior: no agitation, retardation  Speech: normal rate, volume, tone  Language: English, no asphasia  Mood: "anxious"  Affect: full range, appropriate  Thought Process: linear, logical  Associations: cohesive  Thought Content: denies SI   Memory: intact to recent and remote events  Attention: not distracted  Fund of Knowledge: appropriate for education level  Insight: fair  Judgment: fair       Significant Labs:   Last 24 Hours:   Recent Lab Results     None        Significant Imaging: I have reviewed all pertinent imaging results/findings within the past 24 hours.    Assessment/Plan: "     Attention deficit hyperactivity disorder (ADHD), predominantly inattentive type    Hold adderall at this time 2/2 addictive potential - can be reintroduced in future if need be.        Tobacco use disorder    - Counseled on cessation  - Nicotine patches        EDEN (generalized anxiety disorder)    - Continue lexapro  - Continuing to hold home dose of Xanax due to addictive potential and patient's substance abuse  - Continue Wellbutrin        * Depression    - Continue lexapro for depressed mood and anxiety  - Continue Wellbutrin for mood, attention             Need for Continued Hospitalization:   Patient stabilized and ready for discharge from inpatient psychiatric unit.    Anticipated Disposition: Home or Self Care    Carlitos Mary MD   Psychiatry  [facility]

## 2017-07-26 NOTE — NURSING
Pt discharged home to care of self/family. Ambulatory with steady gait. Denies SI/HI/AVH, denies depressed mood or thoughts of self harm. Pt future oriented and goal directed. Affect is bright and appropriate. Pt discharge medications reviewed with pt and educated provided. Pt discharge medications called in to [facility] [address] per patient request. Pt to follow up with IOP of choice. Pt will attend [facility] 8/1 at 9:00 and [facility] 8/1 at 13:30 for evaluation of services. Pt was provided with National Suicide Lifeline contact number and instructed to return to nearest emergency dept for re-occurring symptoms. Pt acknowledges and verbalizes understanding of all discharge instructions and follow up care. transportation home provided by family and personal vehicle.

## 2017-07-26 NOTE — SUBJECTIVE & OBJECTIVE
"Interval History:     Family Meeting:  Met with patient and patient's Mother today in treatment team for family meeting. The patient states that he feels ready to go to a lower level of care like an IOP. The patient's mother is agreeable to this and wants for him to continue improving and not to have suicidal thoughts. The mother appears to to be very supportive but states she would like for Matthew to start attending to his own fiances and life without her assistance so much. The mother reports the patient frequently has "meltdowns" and "crisises" when things in his life go wrong and she has made the primary interventions to correct these situations without the patient making any changes himself. She reiterates that she would like Matthew to get to a point to where he can care for himself but does say that she believes his "mental problems" prevent him from achieving these goals.    Provide the patient and his mother with medication education and current regime. Discuss options with them regarding next level of care after discharge.        Psychotherapeutics     Start     Stop Route Frequency Ordered    07/21/17 1230  escitalopram oxalate tablet 20 mg      -- Oral Daily 07/21/17 1122    07/21/17 1230  buPROPion TB24 tablet 150 mg      -- Oral Daily 07/21/17 1122    07/21/17 1222  ramelteon tablet 8 mg      -- Oral Nightly PRN 07/21/17 1122           Review of Systems     All systems reviewed negative.    Objective:     Vital Signs (Most Recent):  Temp: 97.7 °F (36.5 °C) (07/26/17 0800)  Pulse: 77 (07/26/17 0800)  Resp: 17 (07/26/17 0800)  BP: (!) 159/95 (07/26/17 0800) Vital Signs (24h Range):  Temp:  [97.7 °F (36.5 °C)-98 °F (36.7 °C)] 97.7 °F (36.5 °C)  Pulse:  [68-77] 77  Resp:  [17-18] 17  BP: (159-178)/() 159/95     Height: 5' 4" (162.6 cm)  Weight: 74.7 kg (164 lb 10.9 oz)  Body mass index is 28.27 kg/m².    No intake or output data in the 24 hours ending 07/26/17 1004    Physical " "Exam  CONSTITUTIONAL  General Appearance: casual dress, NAD, calm, cooperative    MUSCULOSKELETAL  Muscle Strength and Tone: no weakness, or spasticity  Abnormal Involuntary Movements: no dyskinesia, dystonia  Gait and Station: ambulating without difficutly    PSYCHIATRIC   Level of Consciousness: alert  Orientation: oriented to person, place, situation  Grooming: fair  Psychomotor Behavior: no agitation, retardation  Speech: normal rate, volume, tone  Language: English, no asphasia  Mood: "anxious"  Affect: full range, appropriate  Thought Process: linear, logical  Associations: cohesive  Thought Content: denies SI   Memory: intact to recent and remote events  Attention: not distracted  Fund of Knowledge: appropriate for education level  Insight: fair  Judgment: fair       Significant Labs:   Last 24 Hours:   Recent Lab Results     None        Significant Imaging: I have reviewed all pertinent imaging results/findings within the past 24 hours.  "

## 2017-07-27 ENCOUNTER — PATIENT OUTREACH (OUTPATIENT)
Dept: ADMINISTRATIVE | Facility: CLINIC | Age: 27
End: 2017-07-27

## 2017-07-27 NOTE — PATIENT INSTRUCTIONS
Depression  Depression is one of the most common mental health problems today. It is not just a state of unhappiness or sadness. It is a true disease. The cause seems to be related to a decrease in chemicals that transmit signals in the brain. Having a family history of depression, alcoholism, or suicide increases the risk. Chronic illness, chronic pain, migraine headaches and high emotional stress also increase the risk.  Depression is something we tend to recognize in others, but may have a hard time seeing in ourselves. It can show in many physical and emotional ways:  · Loss of appetite  · Over-eating  · Not being able to sleep  · Sleeping too much  · Tiredness not related to physical exertion  · Restlessness or irritability  · Slowness of movement or speech  · Feeling depressed or withdrawn  · Loss of interest in things you once enjoyed  · Trouble concentrating, poor memory, trouble making decisions  · Thoughts of harming or killing oneself, or thoughts that life is not worth living  · Low self-esteem  The treatment for depression may include both medicine and psychotherapy. Antidepressants can reduce suffering and can improve the ability to function during the depressed period. Therapy can offer emotional support and help you understand emotional factors that may be causing the depression.  Home care  · On-going care and support helps people manage this disease.  Find a healthcare provider and therapist who meet your needs. Seek help when you feel like you may be getting ill.  · Be kind to yourself. Make it a point to do things that you enjoy (gardening, walking in nature, going to a movie, etc.). Reward yourself for small successes.  · Take care of your physical body. Eat a balanced diet (low in saturated fat and high in fruits and vegetables). Exercise at least 3 times a week for 30 minutes. Even mild-moderate exercise (like brisk walking) can make you feel better.  · Avoid alcohol, which can make  depression worse.  · Take medicine as prescribed.  · Tell each of your healthcare providers about all of the prescription drugs, over-the-counter medicines, vitamins, and supplements you take. Certain supplements interact with medicines and can result in dangerous side effects. Ask your pharmacist when you have questions about drug interactions.  · Talk with your family and trusted friends about your feelings and thoughts. Ask them to help you recognize behavior changes early so you can get help and, if needed, medicine can be adjusted.  Follow-up care  Follow up with your healthcare provider, or as advised.  Call 911  Call 911 if you:  · Have suicidal thoughts, a suicide plan, and the means to carry out the plan  · Have trouble breathing  · Are very confused  · Feel very drowsy or have trouble awakening  · Faint or lose consciousness  · Have new chest pain that becomes more severe, lasts longer, or spreads into your shoulder, arm, neck, jaw or back  When to seek medical advice  Call your healthcare provider right away if any of these occur:  · Feeling extreme depression, fear, anxiety, or anger toward yourself or others  · Feeling out of control  · Feeling that you may try to harm yourself or another  · Hearing voices that others do not hear  · Seeing things that others do not see  · Cant sleep or eat for 3 days in a row  · Friends or family express concern over your behavior and ask you to seek help  Date Last Reviewed: 9/29/2015  © 9021-5533 [employer]. [address] [place] [address], [place] [address]. All rights reserved. This information is not intended as a substitute for professional medical care. Always follow your healthcare professional's instructions.

## 2021-01-21 ENCOUNTER — HOSPITAL ENCOUNTER (EMERGENCY)
Facility: HOSPITAL | Age: 31
Discharge: HOME | End: 2021-01-21
Attending: EMERGENCY MEDICINE
Payer: MEDICAID

## 2021-01-21 ENCOUNTER — TELEPHONE (OUTPATIENT)
Dept: NEUROLOGY | Facility: CLINIC | Age: 31
End: 2021-01-21

## 2021-01-21 VITALS
SYSTOLIC BLOOD PRESSURE: 119 MMHG | HEART RATE: 72 BPM | RESPIRATION RATE: 16 BRPM | TEMPERATURE: 99 F | OXYGEN SATURATION: 99 % | DIASTOLIC BLOOD PRESSURE: 83 MMHG

## 2021-01-21 DIAGNOSIS — R20.9 SENSORY DISTURBANCE: Primary | ICD-10-CM

## 2021-01-21 DIAGNOSIS — R07.9 CHEST PAIN: ICD-10-CM

## 2021-01-21 PROBLEM — F41.0 PANIC DISORDER WITHOUT AGORAPHOBIA: Status: ACTIVE | Noted: 2021-01-21

## 2021-01-21 PROBLEM — E78.5 HYPERLIPIDEMIA: Status: ACTIVE | Noted: 2021-01-21

## 2021-01-21 PROBLEM — E78.1 PURE HYPERTRIGLYCERIDEMIA: Status: ACTIVE | Noted: 2021-01-21

## 2021-01-21 PROBLEM — F90.9 ATTENTION DEFICIT HYPERACTIVITY DISORDER: Status: ACTIVE | Noted: 2017-07-21

## 2021-01-21 PROBLEM — R59.1 LYMPHADENOPATHY: Status: ACTIVE | Noted: 2021-01-21

## 2021-01-21 PROBLEM — R79.9 ABNORMAL BLOOD CHEMISTRY LEVEL: Status: ACTIVE | Noted: 2021-01-21

## 2021-01-21 PROBLEM — R74.8 ABNORMAL LIVER ENZYMES: Status: ACTIVE | Noted: 2021-01-21

## 2021-01-21 PROBLEM — F42.9 OBSESSIVE-COMPULSIVE DISORDER: Status: ACTIVE | Noted: 2021-01-21

## 2021-01-21 PROBLEM — F19.11 HISTORY OF DRUG ABUSE: Status: ACTIVE | Noted: 2021-01-21

## 2021-01-21 PROBLEM — K76.0 STEATOSIS OF LIVER: Status: ACTIVE | Noted: 2021-01-21

## 2021-01-21 LAB
CTP QC/QA: YES
SARS-COV-2 RDRP RESP QL NAA+PROBE: NEGATIVE

## 2021-01-21 PROCEDURE — 99285 PR EMERGENCY DEPT VISIT,LEVEL V: ICD-10-PCS | Mod: CS,,, | Performed by: PHYSICIAN ASSISTANT

## 2021-01-21 PROCEDURE — 93010 EKG 12-LEAD: ICD-10-PCS | Mod: ,,, | Performed by: INTERNAL MEDICINE

## 2021-01-21 PROCEDURE — 93010 ELECTROCARDIOGRAM REPORT: CPT | Mod: ,,, | Performed by: INTERNAL MEDICINE

## 2021-01-21 PROCEDURE — 99285 EMERGENCY DEPT VISIT HI MDM: CPT | Mod: CS,,, | Performed by: PHYSICIAN ASSISTANT

## 2021-01-21 PROCEDURE — 99283 EMERGENCY DEPT VISIT LOW MDM: CPT | Mod: 25

## 2021-01-21 PROCEDURE — U0002 COVID-19 LAB TEST NON-CDC: HCPCS | Performed by: EMERGENCY MEDICINE

## 2021-01-21 PROCEDURE — 93005 ELECTROCARDIOGRAM TRACING: CPT

## 2021-01-21 RX ORDER — LOSARTAN POTASSIUM 100 MG/1
100 TABLET ORAL DAILY
COMMUNITY

## 2021-01-21 RX ORDER — PROPRANOLOL HYDROCHLORIDE 160 MG/1
CAPSULE, EXTENDED RELEASE ORAL
COMMUNITY
End: 2021-01-21 | Stop reason: CLARIF

## 2021-01-21 RX ORDER — BUPROPION HYDROCHLORIDE 300 MG/1
300 TABLET ORAL DAILY
COMMUNITY
Start: 2021-01-06

## 2021-01-21 RX ORDER — DEXTROAMPHETAMINE SACCHARATE, AMPHETAMINE ASPARTATE, DEXTROAMPHETAMINE SULFATE AND AMPHETAMINE SULFATE 7.5; 7.5; 7.5; 7.5 MG/1; MG/1; MG/1; MG/1
TABLET ORAL
COMMUNITY

## 2021-01-21 RX ORDER — LUBIPROSTONE 24 UG/1
CAPSULE ORAL
COMMUNITY
End: 2022-07-28

## 2021-01-21 RX ORDER — FAMOTIDINE 40 MG/1
TABLET, FILM COATED ORAL
COMMUNITY
Start: 2020-12-26 | End: 2023-07-15

## 2021-01-21 RX ORDER — GABAPENTIN 300 MG/1
300 CAPSULE ORAL 3 TIMES DAILY PRN
COMMUNITY

## 2021-01-21 RX ORDER — BACLOFEN 20 MG/1
20 TABLET ORAL 3 TIMES DAILY PRN
COMMUNITY
End: 2023-07-26 | Stop reason: ALTCHOICE

## 2021-01-21 RX ORDER — CLONIDINE HYDROCHLORIDE 0.1 MG/1
TABLET ORAL
COMMUNITY
End: 2023-07-15

## 2021-01-21 RX ORDER — TOPIRAMATE 25 MG/1
TABLET ORAL
COMMUNITY
End: 2021-01-21 | Stop reason: CLARIF

## 2021-01-28 ENCOUNTER — TELEPHONE (OUTPATIENT)
Dept: NEUROSURGERY | Facility: CLINIC | Age: 31
End: 2021-01-28

## 2021-02-18 ENCOUNTER — OFFICE VISIT (OUTPATIENT)
Dept: NEUROSURGERY | Facility: CLINIC | Age: 31
End: 2021-02-18
Payer: MEDICAID

## 2021-02-18 ENCOUNTER — HOSPITAL ENCOUNTER (OUTPATIENT)
Dept: RADIOLOGY | Facility: HOSPITAL | Age: 31
Discharge: HOME | End: 2021-02-18
Attending: PHYSICIAN ASSISTANT
Payer: MEDICAID

## 2021-02-18 VITALS
TEMPERATURE: 97 F | BODY MASS INDEX: 28.27 KG/M2 | HEART RATE: 91 BPM | DIASTOLIC BLOOD PRESSURE: 79 MMHG | SYSTOLIC BLOOD PRESSURE: 136 MMHG | HEIGHT: 64 IN

## 2021-02-18 DIAGNOSIS — M54.12 CERVICAL RADICULOPATHY: ICD-10-CM

## 2021-02-18 DIAGNOSIS — M54.12 CERVICAL RADICULOPATHY: Primary | ICD-10-CM

## 2021-02-18 PROCEDURE — 72050 X-RAY EXAM NECK SPINE 4/5VWS: CPT | Mod: TC

## 2021-02-18 PROCEDURE — 72050 XR CERVICAL SPINE AP LAT WITH FLEX EXTEN: ICD-10-PCS | Mod: 26,,, | Performed by: RADIOLOGY

## 2021-02-18 PROCEDURE — 99204 PR OFFICE/OUTPT VISIT, NEW, LEVL IV, 45-59 MIN: ICD-10-PCS | Mod: S$PBB,,, | Performed by: PHYSICIAN ASSISTANT

## 2021-02-18 PROCEDURE — 99214 OFFICE O/P EST MOD 30 MIN: CPT | Mod: PBBFAC,25 | Performed by: PHYSICIAN ASSISTANT

## 2021-02-18 PROCEDURE — 99999 PR PBB SHADOW E&M-EST. PATIENT-LVL IV: ICD-10-PCS | Mod: PBBFAC,,, | Performed by: PHYSICIAN ASSISTANT

## 2021-02-18 PROCEDURE — 99999 PR PBB SHADOW E&M-EST. PATIENT-LVL IV: CPT | Mod: PBBFAC,,, | Performed by: PHYSICIAN ASSISTANT

## 2021-02-18 PROCEDURE — 72050 X-RAY EXAM NECK SPINE 4/5VWS: CPT | Mod: 26,,, | Performed by: RADIOLOGY

## 2021-02-18 PROCEDURE — 99204 OFFICE O/P NEW MOD 45 MIN: CPT | Mod: S$PBB,,, | Performed by: PHYSICIAN ASSISTANT

## 2021-02-18 RX ORDER — ALPRAZOLAM 1 MG/1
1 TABLET ORAL DAILY
COMMUNITY

## 2021-03-12 ENCOUNTER — CLINICAL SUPPORT (OUTPATIENT)
Dept: REHABILITATION | Facility: HOSPITAL | Age: 31
End: 2021-03-12
Payer: MEDICAID

## 2021-03-12 DIAGNOSIS — R29.898 DECREASED ROM OF NECK: ICD-10-CM

## 2021-03-12 DIAGNOSIS — M25.612 DECREASED ROM OF LEFT SHOULDER: ICD-10-CM

## 2021-03-12 DIAGNOSIS — M54.12 CERVICAL RADICULOPATHY: ICD-10-CM

## 2021-03-12 DIAGNOSIS — R29.898 IMPAIRED STRENGTH OF NECK MUSCLES: ICD-10-CM

## 2021-03-12 PROCEDURE — 97162 PT EVAL MOD COMPLEX 30 MIN: CPT | Mod: PO

## 2021-03-12 PROCEDURE — 97110 THERAPEUTIC EXERCISES: CPT | Mod: PO

## 2021-03-16 ENCOUNTER — HOSPITAL ENCOUNTER (EMERGENCY)
Facility: HOSPITAL | Age: 31
Discharge: PSYCHIATRIC OR SUBSTANCE USE FACILITY | End: 2021-03-17
Attending: EMERGENCY MEDICINE
Payer: MEDICAID

## 2021-03-16 DIAGNOSIS — F19.10 DRUG ABUSE: Primary | ICD-10-CM

## 2021-03-16 DIAGNOSIS — Z00.8 MEDICAL CLEARANCE FOR PSYCHIATRIC ADMISSION: ICD-10-CM

## 2021-03-16 LAB
ALBUMIN SERPL BCP-MCNC: 4.2 G/DL (ref 3.5–5.2)
ALP SERPL-CCNC: 62 U/L (ref 55–135)
ALT SERPL W/O P-5'-P-CCNC: 43 U/L (ref 10–44)
ANION GAP SERPL CALC-SCNC: 8 MMOL/L (ref 8–16)
APAP SERPL-MCNC: <3 UG/ML (ref 10–20)
AST SERPL-CCNC: 26 U/L (ref 10–40)
BASOPHILS # BLD AUTO: 0.05 K/UL (ref 0–0.2)
BASOPHILS NFR BLD: 0.5 % (ref 0–1.9)
BILIRUB SERPL-MCNC: 0.3 MG/DL (ref 0.1–1)
BILIRUB UR QL STRIP: NEGATIVE
BUN SERPL-MCNC: 11 MG/DL (ref 6–20)
CALCIUM SERPL-MCNC: 8.6 MG/DL (ref 8.7–10.5)
CHLORIDE SERPL-SCNC: 109 MMOL/L (ref 95–110)
CLARITY UR REFRACT.AUTO: CLEAR
CO2 SERPL-SCNC: 23 MMOL/L (ref 23–29)
COLOR UR AUTO: YELLOW
CREAT SERPL-MCNC: 1 MG/DL (ref 0.5–1.4)
CTP QC/QA: YES
DIFFERENTIAL METHOD: ABNORMAL
EOSINOPHIL # BLD AUTO: 0.1 K/UL (ref 0–0.5)
EOSINOPHIL NFR BLD: 1.5 % (ref 0–8)
ERYTHROCYTE [DISTWIDTH] IN BLOOD BY AUTOMATED COUNT: 13.3 % (ref 11.5–14.5)
EST. GFR  (AFRICAN AMERICAN): >60 ML/MIN/1.73 M^2
EST. GFR  (NON AFRICAN AMERICAN): >60 ML/MIN/1.73 M^2
ETHANOL SERPL-MCNC: <10 MG/DL
GLUCOSE SERPL-MCNC: 94 MG/DL (ref 70–110)
GLUCOSE UR QL STRIP: NEGATIVE
HCT VFR BLD AUTO: 47.3 % (ref 40–54)
HGB BLD-MCNC: 16.4 G/DL (ref 14–18)
HGB UR QL STRIP: NEGATIVE
IMM GRANULOCYTES # BLD AUTO: 0.02 K/UL (ref 0–0.04)
IMM GRANULOCYTES NFR BLD AUTO: 0.2 % (ref 0–0.5)
KETONES UR QL STRIP: NEGATIVE
LEUKOCYTE ESTERASE UR QL STRIP: NEGATIVE
LYMPHOCYTES # BLD AUTO: 3.5 K/UL (ref 1–4.8)
LYMPHOCYTES NFR BLD: 38.9 % (ref 18–48)
MCH RBC QN AUTO: 31.7 PG (ref 27–31)
MCHC RBC AUTO-ENTMCNC: 34.7 G/DL (ref 32–36)
MCV RBC AUTO: 92 FL (ref 82–98)
MONOCYTES # BLD AUTO: 0.9 K/UL (ref 0.3–1)
MONOCYTES NFR BLD: 10.3 % (ref 4–15)
NEUTROPHILS # BLD AUTO: 4.4 K/UL (ref 1.8–7.7)
NEUTROPHILS NFR BLD: 48.6 % (ref 38–73)
NITRITE UR QL STRIP: NEGATIVE
NRBC BLD-RTO: 0 /100 WBC
PH UR STRIP: 5 [PH] (ref 5–8)
PLATELET # BLD AUTO: 234 K/UL (ref 150–350)
PMV BLD AUTO: 9.6 FL (ref 9.2–12.9)
POTASSIUM SERPL-SCNC: 3.9 MMOL/L (ref 3.5–5.1)
PROT SERPL-MCNC: 7.5 G/DL (ref 6–8.4)
PROT UR QL STRIP: NEGATIVE
RBC # BLD AUTO: 5.17 M/UL (ref 4.6–6.2)
SARS-COV-2 RDRP RESP QL NAA+PROBE: NEGATIVE
SODIUM SERPL-SCNC: 140 MMOL/L (ref 136–145)
SP GR UR STRIP: 1.01 (ref 1–1.03)
TSH SERPL DL<=0.005 MIU/L-ACNC: 2.75 UIU/ML (ref 0.4–4)
URN SPEC COLLECT METH UR: NORMAL
WBC # BLD AUTO: 9.1 K/UL (ref 3.9–12.7)

## 2021-03-16 PROCEDURE — 80143 DRUG ASSAY ACETAMINOPHEN: CPT | Performed by: EMERGENCY MEDICINE

## 2021-03-16 PROCEDURE — 81003 URINALYSIS AUTO W/O SCOPE: CPT | Mod: 59 | Performed by: EMERGENCY MEDICINE

## 2021-03-16 PROCEDURE — 99285 EMERGENCY DEPT VISIT HI MDM: CPT | Mod: CS,,, | Performed by: EMERGENCY MEDICINE

## 2021-03-16 PROCEDURE — 99285 PR EMERGENCY DEPT VISIT,LEVEL V: ICD-10-PCS | Mod: CS,,, | Performed by: EMERGENCY MEDICINE

## 2021-03-16 PROCEDURE — U0002 COVID-19 LAB TEST NON-CDC: HCPCS | Performed by: EMERGENCY MEDICINE

## 2021-03-16 PROCEDURE — 82077 ASSAY SPEC XCP UR&BREATH IA: CPT | Performed by: EMERGENCY MEDICINE

## 2021-03-16 PROCEDURE — 84443 ASSAY THYROID STIM HORMONE: CPT | Performed by: EMERGENCY MEDICINE

## 2021-03-16 PROCEDURE — 80307 DRUG TEST PRSMV CHEM ANLYZR: CPT | Performed by: EMERGENCY MEDICINE

## 2021-03-16 PROCEDURE — 80053 COMPREHEN METABOLIC PANEL: CPT | Performed by: EMERGENCY MEDICINE

## 2021-03-16 PROCEDURE — 85025 COMPLETE CBC W/AUTO DIFF WBC: CPT | Performed by: EMERGENCY MEDICINE

## 2021-03-17 VITALS
WEIGHT: 160 LBS | DIASTOLIC BLOOD PRESSURE: 98 MMHG | BODY MASS INDEX: 27.31 KG/M2 | SYSTOLIC BLOOD PRESSURE: 135 MMHG | OXYGEN SATURATION: 96 % | RESPIRATION RATE: 18 BRPM | TEMPERATURE: 98 F | HEIGHT: 64 IN | HEART RATE: 73 BPM

## 2021-03-17 LAB
AMPHET+METHAMPHET UR QL: NEGATIVE
BARBITURATES UR QL SCN>200 NG/ML: NEGATIVE
BENZODIAZ UR QL SCN>200 NG/ML: NEGATIVE
BZE UR QL SCN: NEGATIVE
CANNABINOIDS UR QL SCN: NEGATIVE
CREAT UR-MCNC: 109 MG/DL (ref 23–375)
METHADONE UR QL SCN>300 NG/ML: NEGATIVE
OPIATES UR QL SCN: NEGATIVE
PCP UR QL SCN>25 NG/ML: NEGATIVE
TOXICOLOGY INFORMATION: NORMAL

## 2021-03-17 PROCEDURE — 99285 EMERGENCY DEPT VISIT HI MDM: CPT | Mod: 25

## 2021-03-24 ENCOUNTER — TELEPHONE (OUTPATIENT)
Dept: NEUROSURGERY | Facility: CLINIC | Age: 31
End: 2021-03-24

## 2021-03-26 ENCOUNTER — CLINICAL SUPPORT (OUTPATIENT)
Dept: REHABILITATION | Facility: HOSPITAL | Age: 31
End: 2021-03-26
Payer: MEDICAID

## 2021-03-26 DIAGNOSIS — M25.612 DECREASED ROM OF LEFT SHOULDER: ICD-10-CM

## 2021-03-26 DIAGNOSIS — R29.898 IMPAIRED STRENGTH OF NECK MUSCLES: ICD-10-CM

## 2021-03-26 DIAGNOSIS — R29.898 DECREASED ROM OF NECK: ICD-10-CM

## 2021-03-26 PROCEDURE — 97140 MANUAL THERAPY 1/> REGIONS: CPT | Mod: PO,CQ

## 2021-03-26 PROCEDURE — 97110 THERAPEUTIC EXERCISES: CPT | Mod: PO,CQ

## 2021-03-31 ENCOUNTER — CLINICAL SUPPORT (OUTPATIENT)
Dept: REHABILITATION | Facility: HOSPITAL | Age: 31
End: 2021-03-31
Payer: MEDICAID

## 2021-03-31 DIAGNOSIS — R29.898 DECREASED ROM OF NECK: ICD-10-CM

## 2021-03-31 DIAGNOSIS — M25.612 DECREASED ROM OF LEFT SHOULDER: ICD-10-CM

## 2021-03-31 DIAGNOSIS — R29.898 IMPAIRED STRENGTH OF NECK MUSCLES: ICD-10-CM

## 2021-03-31 PROCEDURE — 97110 THERAPEUTIC EXERCISES: CPT | Mod: PO,CQ

## 2021-04-07 ENCOUNTER — CLINICAL SUPPORT (OUTPATIENT)
Dept: REHABILITATION | Facility: HOSPITAL | Age: 31
End: 2021-04-07
Payer: MEDICAID

## 2021-04-07 DIAGNOSIS — R29.898 IMPAIRED STRENGTH OF NECK MUSCLES: ICD-10-CM

## 2021-04-07 DIAGNOSIS — R29.898 DECREASED ROM OF NECK: ICD-10-CM

## 2021-04-07 DIAGNOSIS — M25.612 DECREASED ROM OF LEFT SHOULDER: ICD-10-CM

## 2021-04-07 PROCEDURE — 97110 THERAPEUTIC EXERCISES: CPT | Mod: PO,CQ

## 2021-04-09 ENCOUNTER — CLINICAL SUPPORT (OUTPATIENT)
Dept: REHABILITATION | Facility: HOSPITAL | Age: 31
End: 2021-04-09
Payer: MEDICAID

## 2021-04-09 DIAGNOSIS — M25.612 DECREASED ROM OF LEFT SHOULDER: ICD-10-CM

## 2021-04-09 DIAGNOSIS — R29.898 DECREASED ROM OF NECK: ICD-10-CM

## 2021-04-09 DIAGNOSIS — R29.898 IMPAIRED STRENGTH OF NECK MUSCLES: ICD-10-CM

## 2021-04-09 PROCEDURE — 97140 MANUAL THERAPY 1/> REGIONS: CPT | Mod: PO

## 2021-04-09 PROCEDURE — 97110 THERAPEUTIC EXERCISES: CPT | Mod: PO

## 2021-04-13 ENCOUNTER — CLINICAL SUPPORT (OUTPATIENT)
Dept: REHABILITATION | Facility: HOSPITAL | Age: 31
End: 2021-04-13
Payer: MEDICAID

## 2021-04-13 DIAGNOSIS — R29.898 DECREASED ROM OF NECK: ICD-10-CM

## 2021-04-13 DIAGNOSIS — M25.612 DECREASED ROM OF LEFT SHOULDER: ICD-10-CM

## 2021-04-13 DIAGNOSIS — R29.898 IMPAIRED STRENGTH OF NECK MUSCLES: ICD-10-CM

## 2021-04-13 PROCEDURE — 97110 THERAPEUTIC EXERCISES: CPT | Mod: PO

## 2021-04-19 ENCOUNTER — OFFICE VISIT (OUTPATIENT)
Dept: NEUROSURGERY | Facility: CLINIC | Age: 31
End: 2021-04-19
Payer: MEDICAID

## 2021-04-19 VITALS
BODY MASS INDEX: 28.91 KG/M2 | WEIGHT: 169.31 LBS | HEART RATE: 62 BPM | SYSTOLIC BLOOD PRESSURE: 126 MMHG | DIASTOLIC BLOOD PRESSURE: 78 MMHG | TEMPERATURE: 98 F | HEIGHT: 64 IN

## 2021-04-19 DIAGNOSIS — M50.20 HERNIATED CERVICAL INTERVERTEBRAL DISC: ICD-10-CM

## 2021-04-19 DIAGNOSIS — M47.22 CERVICAL SPONDYLOSIS WITH RADICULOPATHY: ICD-10-CM

## 2021-04-19 PROCEDURE — 99999 PR PBB SHADOW E&M-EST. PATIENT-LVL III: CPT | Mod: PBBFAC,,, | Performed by: NEUROLOGICAL SURGERY

## 2021-04-19 PROCEDURE — 99213 OFFICE O/P EST LOW 20 MIN: CPT | Mod: PBBFAC | Performed by: NEUROLOGICAL SURGERY

## 2021-04-19 PROCEDURE — 99214 OFFICE O/P EST MOD 30 MIN: CPT | Mod: S$PBB,,, | Performed by: NEUROLOGICAL SURGERY

## 2021-04-19 PROCEDURE — 99214 PR OFFICE/OUTPT VISIT, EST, LEVL IV, 30-39 MIN: ICD-10-PCS | Mod: S$PBB,,, | Performed by: NEUROLOGICAL SURGERY

## 2021-04-19 PROCEDURE — 99999 PR PBB SHADOW E&M-EST. PATIENT-LVL III: ICD-10-PCS | Mod: PBBFAC,,, | Performed by: NEUROLOGICAL SURGERY

## 2021-04-22 ENCOUNTER — DOCUMENTATION ONLY (OUTPATIENT)
Dept: REHABILITATION | Facility: HOSPITAL | Age: 31
End: 2021-04-22

## 2021-04-26 ENCOUNTER — CLINICAL SUPPORT (OUTPATIENT)
Dept: REHABILITATION | Facility: HOSPITAL | Age: 31
End: 2021-04-26
Payer: MEDICAID

## 2021-04-26 DIAGNOSIS — R26.9 GAIT ABNORMALITY: ICD-10-CM

## 2021-04-26 DIAGNOSIS — M25.671 DECREASED RANGE OF MOTION OF RIGHT ANKLE: ICD-10-CM

## 2021-04-26 PROBLEM — M25.673 DECREASED ROM OF ANKLE: Status: ACTIVE | Noted: 2021-04-26

## 2021-04-26 PROCEDURE — 97110 THERAPEUTIC EXERCISES: CPT | Mod: PO

## 2021-04-26 PROCEDURE — 97161 PT EVAL LOW COMPLEX 20 MIN: CPT | Mod: PO

## 2021-04-28 ENCOUNTER — PATIENT MESSAGE (OUTPATIENT)
Dept: RESEARCH | Facility: HOSPITAL | Age: 31
End: 2021-04-28

## 2021-04-28 ENCOUNTER — CLINICAL SUPPORT (OUTPATIENT)
Dept: REHABILITATION | Facility: HOSPITAL | Age: 31
End: 2021-04-28
Payer: MEDICAID

## 2021-04-28 DIAGNOSIS — R26.9 GAIT ABNORMALITY: ICD-10-CM

## 2021-04-28 DIAGNOSIS — M25.671 DECREASED RANGE OF MOTION OF RIGHT ANKLE: ICD-10-CM

## 2021-04-28 PROCEDURE — 97110 THERAPEUTIC EXERCISES: CPT | Mod: PO,CQ

## 2021-05-03 ENCOUNTER — CLINICAL SUPPORT (OUTPATIENT)
Dept: REHABILITATION | Facility: HOSPITAL | Age: 31
End: 2021-05-03
Payer: MEDICAID

## 2021-05-03 DIAGNOSIS — R26.9 GAIT ABNORMALITY: ICD-10-CM

## 2021-05-03 DIAGNOSIS — M25.671 DECREASED RANGE OF MOTION OF RIGHT ANKLE: ICD-10-CM

## 2021-05-03 PROCEDURE — 97110 THERAPEUTIC EXERCISES: CPT | Mod: PO

## 2021-05-05 ENCOUNTER — CLINICAL SUPPORT (OUTPATIENT)
Dept: REHABILITATION | Facility: HOSPITAL | Age: 31
End: 2021-05-05
Payer: MEDICAID

## 2021-05-05 DIAGNOSIS — M25.671 DECREASED RANGE OF MOTION OF RIGHT ANKLE: ICD-10-CM

## 2021-05-05 DIAGNOSIS — R26.9 GAIT ABNORMALITY: ICD-10-CM

## 2021-05-05 PROCEDURE — 97110 THERAPEUTIC EXERCISES: CPT | Mod: PO

## 2021-05-10 ENCOUNTER — CLINICAL SUPPORT (OUTPATIENT)
Dept: REHABILITATION | Facility: HOSPITAL | Age: 31
End: 2021-05-10
Payer: MEDICAID

## 2021-05-10 DIAGNOSIS — M25.671 DECREASED RANGE OF MOTION OF RIGHT ANKLE: ICD-10-CM

## 2021-05-10 DIAGNOSIS — R26.9 GAIT ABNORMALITY: ICD-10-CM

## 2021-05-10 PROCEDURE — 97110 THERAPEUTIC EXERCISES: CPT | Mod: PO

## 2021-05-12 ENCOUNTER — CLINICAL SUPPORT (OUTPATIENT)
Dept: REHABILITATION | Facility: HOSPITAL | Age: 31
End: 2021-05-12
Payer: MEDICAID

## 2021-05-12 DIAGNOSIS — R26.9 GAIT ABNORMALITY: ICD-10-CM

## 2021-05-12 DIAGNOSIS — M25.671 DECREASED RANGE OF MOTION OF RIGHT ANKLE: ICD-10-CM

## 2021-05-12 PROCEDURE — 97110 THERAPEUTIC EXERCISES: CPT | Mod: PO

## 2021-06-01 ENCOUNTER — CLINICAL SUPPORT (OUTPATIENT)
Dept: REHABILITATION | Facility: HOSPITAL | Age: 31
End: 2021-06-01
Payer: MEDICAID

## 2021-06-01 DIAGNOSIS — R26.9 GAIT ABNORMALITY: ICD-10-CM

## 2021-06-01 DIAGNOSIS — M25.671 DECREASED RANGE OF MOTION OF RIGHT ANKLE: ICD-10-CM

## 2021-06-01 PROCEDURE — 97140 MANUAL THERAPY 1/> REGIONS: CPT | Mod: PO

## 2021-06-01 PROCEDURE — 97110 THERAPEUTIC EXERCISES: CPT | Mod: PO

## 2021-09-03 ENCOUNTER — PATIENT MESSAGE (OUTPATIENT)
Dept: NEUROSURGERY | Facility: CLINIC | Age: 31
End: 2021-09-03

## 2021-09-19 ENCOUNTER — HOSPITAL ENCOUNTER (EMERGENCY)
Facility: HOSPITAL | Age: 31
Discharge: HOME | End: 2021-09-19
Attending: EMERGENCY MEDICINE
Payer: MEDICAID

## 2021-09-19 VITALS
DIASTOLIC BLOOD PRESSURE: 91 MMHG | HEART RATE: 86 BPM | HEIGHT: 64 IN | TEMPERATURE: 99 F | OXYGEN SATURATION: 98 % | SYSTOLIC BLOOD PRESSURE: 143 MMHG | RESPIRATION RATE: 18 BRPM | BODY MASS INDEX: 25.6 KG/M2 | WEIGHT: 149.94 LBS

## 2021-09-19 DIAGNOSIS — R07.9 CHEST PAIN: ICD-10-CM

## 2021-09-19 LAB
ALBUMIN SERPL BCP-MCNC: 4.3 G/DL (ref 3.5–5.2)
ALP SERPL-CCNC: 63 U/L (ref 55–135)
ALT SERPL W/O P-5'-P-CCNC: 67 U/L (ref 10–44)
ANION GAP SERPL CALC-SCNC: 14 MMOL/L (ref 8–16)
AST SERPL-CCNC: 36 U/L (ref 10–40)
BASOPHILS # BLD AUTO: 0.04 K/UL (ref 0–0.2)
BASOPHILS NFR BLD: 0.5 % (ref 0–1.9)
BILIRUB SERPL-MCNC: 0.4 MG/DL (ref 0.1–1)
BNP SERPL-MCNC: <10 PG/ML (ref 0–99)
BUN SERPL-MCNC: 13 MG/DL (ref 6–20)
CALCIUM SERPL-MCNC: 9.7 MG/DL (ref 8.7–10.5)
CHLORIDE SERPL-SCNC: 101 MMOL/L (ref 95–110)
CO2 SERPL-SCNC: 24 MMOL/L (ref 23–29)
CREAT SERPL-MCNC: 0.9 MG/DL (ref 0.5–1.4)
DIFFERENTIAL METHOD: ABNORMAL
EOSINOPHIL # BLD AUTO: 0.1 K/UL (ref 0–0.5)
EOSINOPHIL NFR BLD: 0.8 % (ref 0–8)
ERYTHROCYTE [DISTWIDTH] IN BLOOD BY AUTOMATED COUNT: 12.8 % (ref 11.5–14.5)
EST. GFR  (AFRICAN AMERICAN): >60 ML/MIN/1.73 M^2
EST. GFR  (NON AFRICAN AMERICAN): >60 ML/MIN/1.73 M^2
GLUCOSE SERPL-MCNC: 120 MG/DL (ref 70–110)
HCT VFR BLD AUTO: 51.3 % (ref 40–54)
HGB BLD-MCNC: 18.3 G/DL (ref 14–18)
IMM GRANULOCYTES # BLD AUTO: 0.02 K/UL (ref 0–0.04)
IMM GRANULOCYTES NFR BLD AUTO: 0.2 % (ref 0–0.5)
LYMPHOCYTES # BLD AUTO: 2.8 K/UL (ref 1–4.8)
LYMPHOCYTES NFR BLD: 32.7 % (ref 18–48)
MCH RBC QN AUTO: 31.6 PG (ref 27–31)
MCHC RBC AUTO-ENTMCNC: 35.7 G/DL (ref 32–36)
MCV RBC AUTO: 88 FL (ref 82–98)
MONOCYTES # BLD AUTO: 0.6 K/UL (ref 0.3–1)
MONOCYTES NFR BLD: 6.7 % (ref 4–15)
NEUTROPHILS # BLD AUTO: 5 K/UL (ref 1.8–7.7)
NEUTROPHILS NFR BLD: 59.1 % (ref 38–73)
NRBC BLD-RTO: 0 /100 WBC
PLATELET # BLD AUTO: 240 K/UL (ref 150–450)
PMV BLD AUTO: 10.2 FL (ref 9.2–12.9)
POTASSIUM SERPL-SCNC: 3.7 MMOL/L (ref 3.5–5.1)
PROT SERPL-MCNC: 8.1 G/DL (ref 6–8.4)
RBC # BLD AUTO: 5.8 M/UL (ref 4.6–6.2)
SODIUM SERPL-SCNC: 139 MMOL/L (ref 136–145)
TROPONIN I SERPL DL<=0.01 NG/ML-MCNC: 0.01 NG/ML (ref 0–0.03)
TROPONIN I SERPL DL<=0.01 NG/ML-MCNC: 0.01 NG/ML (ref 0–0.03)
TROPONIN I SERPL DL<=0.01 NG/ML-MCNC: <0.006 NG/ML (ref 0–0.03)
WBC # BLD AUTO: 8.53 K/UL (ref 3.9–12.7)

## 2021-09-19 PROCEDURE — 86803 HEPATITIS C AB TEST: CPT | Performed by: EMERGENCY MEDICINE

## 2021-09-19 PROCEDURE — 99284 EMERGENCY DEPT VISIT MOD MDM: CPT | Mod: ,,, | Performed by: EMERGENCY MEDICINE

## 2021-09-19 PROCEDURE — 99285 EMERGENCY DEPT VISIT HI MDM: CPT | Mod: 25

## 2021-09-19 PROCEDURE — 93005 ELECTROCARDIOGRAM TRACING: CPT

## 2021-09-19 PROCEDURE — 93010 ELECTROCARDIOGRAM REPORT: CPT | Mod: ,,, | Performed by: INTERNAL MEDICINE

## 2021-09-19 PROCEDURE — 93010 EKG 12-LEAD: ICD-10-PCS | Mod: ,,, | Performed by: INTERNAL MEDICINE

## 2021-09-19 PROCEDURE — 80053 COMPREHEN METABOLIC PANEL: CPT | Performed by: EMERGENCY MEDICINE

## 2021-09-19 PROCEDURE — 25000003 PHARM REV CODE 250: Performed by: EMERGENCY MEDICINE

## 2021-09-19 PROCEDURE — 87389 HIV-1 AG W/HIV-1&-2 AB AG IA: CPT | Performed by: EMERGENCY MEDICINE

## 2021-09-19 PROCEDURE — 85025 COMPLETE CBC W/AUTO DIFF WBC: CPT | Performed by: EMERGENCY MEDICINE

## 2021-09-19 PROCEDURE — 99284 PR EMERGENCY DEPT VISIT,LEVEL IV: ICD-10-PCS | Mod: ,,, | Performed by: EMERGENCY MEDICINE

## 2021-09-19 PROCEDURE — 36000 PLACE NEEDLE IN VEIN: CPT

## 2021-09-19 PROCEDURE — 84484 ASSAY OF TROPONIN QUANT: CPT | Performed by: EMERGENCY MEDICINE

## 2021-09-19 PROCEDURE — 83880 ASSAY OF NATRIURETIC PEPTIDE: CPT | Performed by: EMERGENCY MEDICINE

## 2021-09-19 RX ORDER — LOSARTAN POTASSIUM 50 MG/1
100 TABLET ORAL DAILY
Status: DISCONTINUED | OUTPATIENT
Start: 2021-09-19 | End: 2021-09-19

## 2021-09-19 RX ORDER — LOSARTAN POTASSIUM 100 MG/1
100 TABLET ORAL DAILY
Qty: 90 TABLET | Refills: 0 | Status: SHIPPED | OUTPATIENT
Start: 2021-09-19 | End: 2021-12-18

## 2021-09-19 RX ORDER — ASPIRIN 325 MG
325 TABLET ORAL
Status: COMPLETED | OUTPATIENT
Start: 2021-09-19 | End: 2021-09-19

## 2021-09-19 RX ORDER — ALPRAZOLAM 0.25 MG/1
1 TABLET ORAL
Status: COMPLETED | OUTPATIENT
Start: 2021-09-19 | End: 2021-09-19

## 2021-09-19 RX ORDER — LOSARTAN POTASSIUM 50 MG/1
100 TABLET ORAL
Status: COMPLETED | OUTPATIENT
Start: 2021-09-19 | End: 2021-09-19

## 2021-09-19 RX ADMIN — LOSARTAN POTASSIUM 100 MG: 50 TABLET, FILM COATED ORAL at 12:09

## 2021-09-19 RX ADMIN — ASPIRIN 325 MG ORAL TABLET 325 MG: 325 PILL ORAL at 12:09

## 2021-09-19 RX ADMIN — ALPRAZOLAM 1 MG: 0.25 TABLET ORAL at 12:09

## 2021-09-20 LAB
HCV AB SERPL QL IA: NEGATIVE
HIV 1+2 AB+HIV1 P24 AG SERPL QL IA: NEGATIVE

## 2021-10-26 ENCOUNTER — TELEPHONE (OUTPATIENT)
Dept: NEUROSURGERY | Facility: CLINIC | Age: 31
End: 2021-10-26
Payer: MEDICAID

## 2021-10-26 DIAGNOSIS — M54.12 CERVICAL RADICULOPATHY: ICD-10-CM

## 2021-10-26 DIAGNOSIS — M50.20 HERNIATED CERVICAL INTERVERTEBRAL DISC: ICD-10-CM

## 2021-10-26 DIAGNOSIS — M47.22 CERVICAL SPONDYLOSIS WITH RADICULOPATHY: Primary | ICD-10-CM

## 2022-07-21 ENCOUNTER — DOCUMENTATION ONLY (OUTPATIENT)
Dept: TRANSPLANT | Facility: CLINIC | Age: 32
End: 2022-07-21
Payer: MEDICAID

## 2022-07-21 NOTE — LETTER
July 21, 2022    Lui Austin  [address] [facility] [address]      Dear Lui Austin:    Your doctor has referred you to the [facility]. We are sending this letter to remind you to make an appointment with us to complete the referral process.     Please call us at [phone] to schedule an appointment. We look forward to seeing you soon.     If you received a call and have been scheduled, please disregard this letter.       Sincerely,        [facility]   [facility] [place] [address]  [phone]

## 2022-07-22 ENCOUNTER — TELEPHONE (OUTPATIENT)
Dept: HEPATOLOGY | Facility: CLINIC | Age: 32
End: 2022-07-22
Payer: MEDICAID

## 2022-07-22 NOTE — TELEPHONE ENCOUNTER
----- Message from Coco Santos sent at 7/21/2022  8:38 PM CDT -----  Regarding: REFERRAL  Pt records reviewed.  Pt will be referred to Hepatology due to fatty liver and elevated lfts  Initial referral received  from Jasen Francois [address] [place] [address]  Referral letter sent to provider and patient.      RECORDS SCANNED IN MEDIA UNDER HEPATOLOGY REFERRAL .

## 2022-07-22 NOTE — NURSING
Pt records reviewed.  Pt will be referred to Hepatology due to fatty liver and elevated lfts  Initial referral received  from Jasen Francois [facility]  [address] [place] [address]  Referral letter sent to provider and patient.      RECORDS SCANNED IN MEDIA UNDER HEPATOLOGY REFERRAL .

## 2022-07-22 NOTE — TELEPHONE ENCOUNTER
----- Message from Margi Connor MA sent at 7/22/2022 10:36 AM CDT -----  Regarding: FW: miss call    ----- Message -----  From: Humble Ross  Sent: 7/22/2022  10:25 AM CDT  To: Lenin Grover Staff  Subject: miss call                                        Pt returning miss call    Call

## 2022-07-28 ENCOUNTER — LAB VISIT (OUTPATIENT)
Dept: LAB | Facility: HOSPITAL | Age: 32
End: 2022-07-28
Payer: MEDICAID

## 2022-07-28 ENCOUNTER — OFFICE VISIT (OUTPATIENT)
Dept: HEPATOLOGY | Facility: CLINIC | Age: 32
End: 2022-07-28
Payer: MEDICAID

## 2022-07-28 VITALS
DIASTOLIC BLOOD PRESSURE: 82 MMHG | OXYGEN SATURATION: 78 % | HEART RATE: 102 BPM | SYSTOLIC BLOOD PRESSURE: 167 MMHG | BODY MASS INDEX: 29.89 KG/M2 | RESPIRATION RATE: 18 BRPM | HEIGHT: 64 IN | TEMPERATURE: 96 F | WEIGHT: 175.06 LBS

## 2022-07-28 DIAGNOSIS — E78.5 HYPERLIPIDEMIA, UNSPECIFIED HYPERLIPIDEMIA TYPE: ICD-10-CM

## 2022-07-28 DIAGNOSIS — R74.8 ELEVATED LIVER ENZYMES: ICD-10-CM

## 2022-07-28 DIAGNOSIS — R74.8 ABNORMAL LIVER ENZYMES: ICD-10-CM

## 2022-07-28 DIAGNOSIS — K76.0 FATTY LIVER: ICD-10-CM

## 2022-07-28 DIAGNOSIS — E78.1 PURE HYPERTRIGLYCERIDEMIA: ICD-10-CM

## 2022-07-28 DIAGNOSIS — R74.8 ELEVATED LIVER ENZYMES: Primary | ICD-10-CM

## 2022-07-28 DIAGNOSIS — K76.0 STEATOSIS OF LIVER: ICD-10-CM

## 2022-07-28 PROBLEM — T65.92XA SUICIDE ATTEMPT BY SUBSTANCE OVERDOSE: Status: RESOLVED | Noted: 2017-06-25 | Resolved: 2022-07-28

## 2022-07-28 PROBLEM — R79.9 ABNORMAL BLOOD CHEMISTRY LEVEL: Status: RESOLVED | Noted: 2021-01-21 | Resolved: 2022-07-28

## 2022-07-28 LAB
A1AT SERPL-MCNC: 140 MG/DL (ref 100–190)
ALBUMIN SERPL BCP-MCNC: 4.5 G/DL (ref 3.5–5.2)
ALP SERPL-CCNC: 52 U/L (ref 55–135)
ALT SERPL W/O P-5'-P-CCNC: 61 U/L (ref 10–44)
ANION GAP SERPL CALC-SCNC: 14 MMOL/L (ref 8–16)
AST SERPL-CCNC: 28 U/L (ref 10–40)
BILIRUB SERPL-MCNC: 0.3 MG/DL (ref 0.1–1)
BUN SERPL-MCNC: 17 MG/DL (ref 6–20)
CALCIUM SERPL-MCNC: 10 MG/DL (ref 8.7–10.5)
CERULOPLASMIN SERPL-MCNC: 23 MG/DL (ref 15–45)
CHLORIDE SERPL-SCNC: 102 MMOL/L (ref 95–110)
CO2 SERPL-SCNC: 29 MMOL/L (ref 23–29)
CREAT SERPL-MCNC: 1 MG/DL (ref 0.5–1.4)
EST. GFR  (AFRICAN AMERICAN): >60 ML/MIN/1.73 M^2
EST. GFR  (NON AFRICAN AMERICAN): >60 ML/MIN/1.73 M^2
ESTIMATED AVG GLUCOSE: 100 MG/DL (ref 68–131)
FERRITIN SERPL-MCNC: 159 NG/ML (ref 20–300)
GLUCOSE SERPL-MCNC: 101 MG/DL (ref 70–110)
HBA1C MFR BLD: 5.1 % (ref 4–5.6)
IGG SERPL-MCNC: 1039 MG/DL (ref 650–1600)
IRON SERPL-MCNC: 107 UG/DL (ref 45–160)
POTASSIUM SERPL-SCNC: 3.5 MMOL/L (ref 3.5–5.1)
PROT SERPL-MCNC: 7.9 G/DL (ref 6–8.4)
SATURATED IRON: 21 % (ref 20–50)
SODIUM SERPL-SCNC: 145 MMOL/L (ref 136–145)
TOTAL IRON BINDING CAPACITY: 506 UG/DL (ref 250–450)
TRANSFERRIN SERPL-MCNC: 342 MG/DL (ref 200–375)

## 2022-07-28 PROCEDURE — 82103 ALPHA-1-ANTITRYPSIN TOTAL: CPT | Performed by: NURSE PRACTITIONER

## 2022-07-28 PROCEDURE — 80053 COMPREHEN METABOLIC PANEL: CPT | Performed by: NURSE PRACTITIONER

## 2022-07-28 PROCEDURE — 99999 PR PBB SHADOW E&M-EST. PATIENT-LVL V: CPT | Mod: PBBFAC,,, | Performed by: NURSE PRACTITIONER

## 2022-07-28 PROCEDURE — 82784 ASSAY IGA/IGD/IGG/IGM EACH: CPT | Performed by: NURSE PRACTITIONER

## 2022-07-28 PROCEDURE — 86235 NUCLEAR ANTIGEN ANTIBODY: CPT | Mod: 59 | Performed by: NURSE PRACTITIONER

## 2022-07-28 PROCEDURE — 86706 HEP B SURFACE ANTIBODY: CPT | Performed by: NURSE PRACTITIONER

## 2022-07-28 PROCEDURE — 86790 VIRUS ANTIBODY NOS: CPT | Performed by: NURSE PRACTITIONER

## 2022-07-28 PROCEDURE — 3079F DIAST BP 80-89 MM HG: CPT | Mod: CPTII,,, | Performed by: NURSE PRACTITIONER

## 2022-07-28 PROCEDURE — 99203 PR OFFICE/OUTPT VISIT, NEW, LEVL III, 30-44 MIN: ICD-10-PCS | Mod: S$PBB,,, | Performed by: NURSE PRACTITIONER

## 2022-07-28 PROCEDURE — 82728 ASSAY OF FERRITIN: CPT | Performed by: NURSE PRACTITIONER

## 2022-07-28 PROCEDURE — 1159F PR MEDICATION LIST DOCUMENTED IN MEDICAL RECORD: ICD-10-PCS | Mod: CPTII,,, | Performed by: NURSE PRACTITIONER

## 2022-07-28 PROCEDURE — 4010F PR ACE/ARB THEARPY RXD/TAKEN: ICD-10-PCS | Mod: CPTII,,, | Performed by: NURSE PRACTITIONER

## 2022-07-28 PROCEDURE — 3044F PR MOST RECENT HEMOGLOBIN A1C LEVEL <7.0%: ICD-10-PCS | Mod: CPTII,,, | Performed by: NURSE PRACTITIONER

## 2022-07-28 PROCEDURE — 99203 OFFICE O/P NEW LOW 30 MIN: CPT | Mod: S$PBB,,, | Performed by: NURSE PRACTITIONER

## 2022-07-28 PROCEDURE — 1159F MED LIST DOCD IN RCRD: CPT | Mod: CPTII,,, | Performed by: NURSE PRACTITIONER

## 2022-07-28 PROCEDURE — 3077F PR MOST RECENT SYSTOLIC BLOOD PRESSURE >= 140 MM HG: ICD-10-PCS | Mod: CPTII,,, | Performed by: NURSE PRACTITIONER

## 2022-07-28 PROCEDURE — 1160F RVW MEDS BY RX/DR IN RCRD: CPT | Mod: CPTII,,, | Performed by: NURSE PRACTITIONER

## 2022-07-28 PROCEDURE — 83036 HEMOGLOBIN GLYCOSYLATED A1C: CPT | Performed by: NURSE PRACTITIONER

## 2022-07-28 PROCEDURE — 1160F PR REVIEW ALL MEDS BY PRESCRIBER/CLIN PHARMACIST DOCUMENTED: ICD-10-PCS | Mod: CPTII,,, | Performed by: NURSE PRACTITIONER

## 2022-07-28 PROCEDURE — 3044F HG A1C LEVEL LT 7.0%: CPT | Mod: CPTII,,, | Performed by: NURSE PRACTITIONER

## 2022-07-28 PROCEDURE — 84466 ASSAY OF TRANSFERRIN: CPT | Performed by: NURSE PRACTITIONER

## 2022-07-28 PROCEDURE — 86038 ANTINUCLEAR ANTIBODIES: CPT | Performed by: NURSE PRACTITIONER

## 2022-07-28 PROCEDURE — 99999 PR PBB SHADOW E&M-EST. PATIENT-LVL V: ICD-10-PCS | Mod: PBBFAC,,, | Performed by: NURSE PRACTITIONER

## 2022-07-28 PROCEDURE — 82390 ASSAY OF CERULOPLASMIN: CPT | Performed by: NURSE PRACTITIONER

## 2022-07-28 PROCEDURE — 4010F ACE/ARB THERAPY RXD/TAKEN: CPT | Mod: CPTII,,, | Performed by: NURSE PRACTITIONER

## 2022-07-28 PROCEDURE — 3077F SYST BP >= 140 MM HG: CPT | Mod: CPTII,,, | Performed by: NURSE PRACTITIONER

## 2022-07-28 PROCEDURE — 99215 OFFICE O/P EST HI 40 MIN: CPT | Mod: PBBFAC | Performed by: NURSE PRACTITIONER

## 2022-07-28 PROCEDURE — 86704 HEP B CORE ANTIBODY TOTAL: CPT | Performed by: NURSE PRACTITIONER

## 2022-07-28 PROCEDURE — 86235 NUCLEAR ANTIGEN ANTIBODY: CPT | Mod: 91 | Performed by: NURSE PRACTITIONER

## 2022-07-28 PROCEDURE — 3008F BODY MASS INDEX DOCD: CPT | Mod: CPTII,,, | Performed by: NURSE PRACTITIONER

## 2022-07-28 PROCEDURE — 86039 ANTINUCLEAR ANTIBODIES (ANA): CPT | Performed by: NURSE PRACTITIONER

## 2022-07-28 PROCEDURE — 3079F PR MOST RECENT DIASTOLIC BLOOD PRESSURE 80-89 MM HG: ICD-10-PCS | Mod: CPTII,,, | Performed by: NURSE PRACTITIONER

## 2022-07-28 PROCEDURE — 3008F PR BODY MASS INDEX (BMI) DOCUMENTED: ICD-10-PCS | Mod: CPTII,,, | Performed by: NURSE PRACTITIONER

## 2022-07-28 PROCEDURE — 80321 ALCOHOLS BIOMARKERS 1OR 2: CPT | Performed by: NURSE PRACTITIONER

## 2022-07-28 PROCEDURE — 86256 FLUORESCENT ANTIBODY TITER: CPT | Performed by: NURSE PRACTITIONER

## 2022-07-28 RX ORDER — PIMECROLIMUS 10 MG/G
CREAM TOPICAL 2 TIMES DAILY
COMMUNITY

## 2022-07-28 RX ORDER — FLUTICASONE PROPIONATE 50 MCG
1 SPRAY, SUSPENSION (ML) NASAL DAILY
COMMUNITY
End: 2023-07-15

## 2022-07-28 RX ORDER — AZELASTINE 1 MG/ML
1 SPRAY, METERED NASAL 2 TIMES DAILY
COMMUNITY
End: 2023-07-15

## 2022-07-28 RX ORDER — HYDROCHLOROTHIAZIDE 25 MG/1
25 TABLET ORAL DAILY
COMMUNITY

## 2022-07-28 NOTE — PROGRESS NOTES
[facility] VISIT NEW PT NOTE    PCP: Jasen Francois NP     CHIEF COMPLAINT: fatty liver, elevated liver enzymes    HPI: This is a 32 y.o. White male with PMH noted below, presenting for evaluation of  fatty liver disease     2 maternal aunts  of cirrhosis in the last year, unknown etiology, did not drink alcohol     Previous serologic w/u negative for  viral hepatitis A, B and C - will complete full sero w/u    Prior serologic workup:   Lab Results   Component Value Date    HEPCAB Negative 2021     Risk factors for fatty liver include intermittent alcohol use, obesity, HTN, HLD    Liver fibrosis staging:  -- fibroscan with RTC    Interval HPI: Presents today alone. Intermittent alcohol use  No dietary restrictions at this time     Labs done 2022 show elevated transaminase levels (ALT > AST, fluctuating elevation since )  Platelets WNL, alk phos WNL  Synthetic liver functioning WNL    Lab Results   Component Value Date    ALT 67 (H) 2021    AST 36 2021    ALKPHOS 63 2021    BILITOT 0.4 2021    ALBUMIN 4.3 2021     2021     Abd U/S done 2016 showed fatty liver, focal fatty sparing - will repeat     + family history of liver disease: 2 aunts with cirrhosis, unclear etiology. + Alcohol consumption, see below  Social History     Substance and Sexual Activity   Alcohol Use Yes    Comment: no daily use, ~ 1-4 servings weekly       Immunity to Hep A and B - will check with next labs        Allergy and medication list reviewed and updated     PMHX:  has a past medical history of Anxiety, Depression, History of psychiatric hospitalization, psychiatric care, Hypertension, Elayne, Psychiatric problem, Suicide attempt, and Therapy.    PSHX:  has no past surgical history on file.    FAMILY HISTORY: Updated and reviewed in [facility]    SOCIAL HISTORY:   Social History     Substance and Sexual Activity   Alcohol Use Yes    Comment: no daily use, ~ 1-4 servings  "weekly       Social History     Substance and Sexual Activity   Drug Use Not Currently    Types: Marijuana       ROS:   GENERAL: Denies fatigue  CARDIOVASCULAR: Denies edema  GI: Denies abdominal pain  SKIN: Denies rash, itching   NEURO: Denies confusion, memory loss, or mood changes    PHYSICAL EXAM:   Friendly White male, in no acute distress; alert and oriented to person, place and time  VITALS: BP (!) 167/82 (BP Location: Right arm, Patient Position: Sitting, BP Method: Medium (Automatic))   Pulse 102   Temp 96.4 °F (35.8 °C) (Oral)   Resp 18   Ht 5' 4" (1.626 m)   Wt 79.4 kg (175 lb 0.7 oz)   SpO2 (!) 78%   BMI 30.05 kg/m²   EYES: Sclerae anicteric  GI: Soft, non-tender, non-distended. No ascites.  EXTREMITIES:  No edema.  SKIN: Warm and dry. No jaundice. No telangectasias noted. No palmar erythema.  NEURO:  No asterixis.  PSYCH:  Thought and speech pattern appropriate. Behavior normal      EDUCATION:  See instructions discussed with patient in Instructions section of the After Visit Summary     ASSESSMENT & PLAN:  32 y.o. White male with:  1.  Fatty liver, likely related to metabolic risk factors   - see HPI  -- Immunity to Hep A and B : Will check immunity markers for HBV/HAV  and arrange for vaccination if needed  -- Fibroscan with RTC  -- Recommendations discussed with patient:  1. Limit alcohol consumption, no more than 1-2 serving(s) of alcohol in any day (1 serving is 5 ounces of wine, 12 ounces of beer, or 1.5 ounces of liquor) and do NOT recommend any daily alcohol use    2 Weight loss goal of 15 lbs  3. Low carb/sugar, high fiber and protein diet, limit your carb intake to LESS than 30-45 grams of carbs with a meal or LESS than 5-10 grams with any snack   4. Exercise, 5 days per week, 30 minutes per day, as tolerated  5. Recommend good cholesterol, blood pressure, blood sugar levels   6. Consider enrolling in [facility] fibrosis clinical trails - will determine based on liver fibrosis staging " results     2. Elevated liver enzymes  -- will complete full sero w/u   -- labs today  Orders Placed This Encounter   Procedures    FibroScan (Vibration Controlled Transient Elastography)    US Abdomen Complete    Alpha-1-Antitrypsin    BECKY Screen w/Reflex    Antimitochondrial Antibody    Anti-Smooth Muscle Antibody    Ceruloplasmin    Comprehensive Metabolic Panel    Ferritin    Hemoglobin A1C    IgG    Iron and TIBC    Phosphatidylethanol (PETH)    Hepatitis A antibody, IgG    Hepatitis B Core Antibody, Total    Hepatitis B Surface Ab, Qualitative        3. Obesity, HTN, HLD  -- Body mass index is 30.05 kg/m².   -- increases risk for fatty liver          Labs today, US soon then   Follow up in about 1 week (around 8/4/2022). with fibroscan before  Orders Placed This Encounter   Procedures    FibroScan (Vibration Controlled Transient Elastography)    US Abdomen Complete    Alpha-1-Antitrypsin    BECKY Screen w/Reflex    Antimitochondrial Antibody    Anti-Smooth Muscle Antibody    Ceruloplasmin    Comprehensive Metabolic Panel    Ferritin    Hemoglobin A1C    IgG    Iron and TIBC    Phosphatidylethanol (PETH)    Hepatitis A antibody, IgG    Hepatitis B Core Antibody, Total    Hepatitis B Surface Ab, Qualitative        Thank you for allowing me to participate in the care of ALEJANDRO Florian    I spent a total of 30 minutes on the day of the visit.This includes face to face time and non-face to face time preparing to see the patient (eg, review of tests), obtaining and/or reviewing separately obtained history, documenting clinical information in the electronic or other health record, independently interpreting results and communicating results to the patient/family/caregiver, and coordinating care.         CC'ed note to:     Jasen Francois NP

## 2022-07-28 NOTE — PATIENT INSTRUCTIONS
1. Fibroscan to look for fat or scar tissue in the liver with return to clinic   2. Will check immunity markers for Hepatitis A and B and arrange for vaccination if needed  3. Labs today to  check for multiple causes of liver disease. These labs will release to you as soon as they are resulted but we will discuss them in detail at your upcoming visit to discuss what the lab results mean.   4.  Follow up in 1 week with fibroscan same day     There is no FDA approved therapy for fatty liver disease. Therefore, these things are important:  Limit alcohol consumption, none until further notice   2 Weight loss goal of 15 lbs, referral for [facility] if interested. Also, if interested in a dietician visit to create a weight loss plan, contact the dietician team at [facility] at [email or web address] to schedule a visit to you can call [facility] in [place]: [phone] and the  will transfer the call to one of the dieticians to schedule an appointment. Or you can also call [phone] to schedule. They do offer video visits   3. Low carb/sugar, high fiber and protein diet.Try to limit your carb intake to LESS than 30-45 grams of carbs with a meal or LESS than 5-10 grams with any snack (total of any snack foods eaten during that time). Use MyFitness Pal celi to add up your carbs through the day. Do NOT drink any beverages with calories or carbs (this can lead to high blood sugar and weight gain). Also, some of our patients have been very successful with weight loss using the pre made/planned meal planning services that limit calories and portion size (one company in [place] is called [employer] but there are also many others out there. The main thing to look for is low calorie, high protein, low carb)  4. Exercise, 5 days per week, 30 minutes per day, as tolerated  5. Recommend good cholesterol, blood pressure, blood sugar levels     In some people, fatty liver can progress to  steatohepatitis (inflamatory fatty liver) and possibly to cirrhosis, putting one at increased risk for liver cancer, liver failure, and death. Therefore, the lifestyle changes are very important to decrease this risk.     Website with information about fatty liver and inflammation related to fatty liver ([facility]) = www.nashtruth.com  AND [email or web address]

## 2022-07-29 LAB
ANA PATTERN 1: NORMAL
ANA SER QL IF: POSITIVE
ANA TITR SER IF: NORMAL {TITER}
HAV IGG SER QL IA: NEGATIVE
HBV CORE AB SERPL QL IA: NEGATIVE
HBV SURFACE AB SER-ACNC: POSITIVE M[IU]/ML
MITOCHONDRIA AB TITR SER IF: NORMAL {TITER}
SMOOTH MUSCLE AB TITR SER IF: NORMAL {TITER}

## 2022-07-30 LAB
PETH 16:0/18.1 (POPETH): 118 NG/ML
PETH 16:0/18.2 (PLPETH): 99 NG/ML

## 2022-08-01 LAB
ANTI SM ANTIBODY: 0.09 RATIO (ref 0–0.99)
ANTI SM/RNP ANTIBODY: 0.08 RATIO (ref 0–0.99)
ANTI-SM INTERPRETATION: NEGATIVE
ANTI-SM/RNP INTERPRETATION: NEGATIVE
ANTI-SSA ANTIBODY: 0.06 RATIO (ref 0–0.99)
ANTI-SSA INTERPRETATION: NEGATIVE
ANTI-SSB ANTIBODY: 0.06 RATIO (ref 0–0.99)
ANTI-SSB INTERPRETATION: NEGATIVE
DSDNA AB SER-ACNC: NORMAL [IU]/ML

## 2022-08-10 ENCOUNTER — HOSPITAL ENCOUNTER (OUTPATIENT)
Dept: RADIOLOGY | Facility: HOSPITAL | Age: 32
Discharge: HOME | End: 2022-08-10
Attending: NURSE PRACTITIONER
Payer: MEDICAID

## 2022-08-10 ENCOUNTER — HOSPITAL ENCOUNTER (OUTPATIENT)
Dept: RADIOLOGY | Facility: HOSPITAL | Age: 32
Discharge: HOME | End: 2022-08-10
Attending: NEUROLOGICAL SURGERY
Payer: MEDICAID

## 2022-08-10 DIAGNOSIS — M50.20 HERNIATED CERVICAL INTERVERTEBRAL DISC: ICD-10-CM

## 2022-08-10 DIAGNOSIS — R74.8 ELEVATED LIVER ENZYMES: ICD-10-CM

## 2022-08-10 DIAGNOSIS — M47.22 CERVICAL SPONDYLOSIS WITH RADICULOPATHY: ICD-10-CM

## 2022-08-10 DIAGNOSIS — K76.0 FATTY LIVER: ICD-10-CM

## 2022-08-10 DIAGNOSIS — M54.12 CERVICAL RADICULOPATHY: ICD-10-CM

## 2022-08-10 PROCEDURE — 76700 US EXAM ABDOM COMPLETE: CPT | Mod: TC

## 2022-08-10 PROCEDURE — 76700 US EXAM ABDOM COMPLETE: CPT | Mod: 26,,, | Performed by: STUDENT IN AN ORGANIZED HEALTH CARE EDUCATION/TRAINING PROGRAM

## 2022-08-10 PROCEDURE — 72052 X-RAY EXAM NECK SPINE 6/>VWS: CPT | Mod: 26,,, | Performed by: RADIOLOGY

## 2022-08-10 PROCEDURE — 76700 US ABDOMEN COMPLETE: ICD-10-PCS | Mod: 26,,, | Performed by: STUDENT IN AN ORGANIZED HEALTH CARE EDUCATION/TRAINING PROGRAM

## 2022-08-10 PROCEDURE — 72052 X-RAY EXAM NECK SPINE 6/>VWS: CPT | Mod: TC

## 2022-08-10 PROCEDURE — 72052 XR CERVICAL SPINE 5 VIEW WITH FLEX AND EXT: ICD-10-PCS | Mod: 26,,, | Performed by: RADIOLOGY

## 2022-08-18 ENCOUNTER — PROCEDURE VISIT (OUTPATIENT)
Dept: HEPATOLOGY | Facility: CLINIC | Age: 32
End: 2022-08-18
Payer: MEDICAID

## 2022-08-18 ENCOUNTER — OFFICE VISIT (OUTPATIENT)
Dept: HEPATOLOGY | Facility: CLINIC | Age: 32
End: 2022-08-18
Payer: MEDICAID

## 2022-08-18 VITALS
OXYGEN SATURATION: 99 % | TEMPERATURE: 98 F | WEIGHT: 172.19 LBS | HEIGHT: 64 IN | RESPIRATION RATE: 18 BRPM | DIASTOLIC BLOOD PRESSURE: 95 MMHG | HEART RATE: 98 BPM | SYSTOLIC BLOOD PRESSURE: 160 MMHG | BODY MASS INDEX: 29.4 KG/M2

## 2022-08-18 DIAGNOSIS — R03.0 ELEVATED BLOOD PRESSURE READING: ICD-10-CM

## 2022-08-18 DIAGNOSIS — E78.1 PURE HYPERTRIGLYCERIDEMIA: ICD-10-CM

## 2022-08-18 DIAGNOSIS — E66.3 OVERWEIGHT (BMI 25.0-29.9): ICD-10-CM

## 2022-08-18 DIAGNOSIS — R74.8 ELEVATED LIVER ENZYMES: Primary | ICD-10-CM

## 2022-08-18 DIAGNOSIS — R74.8 ELEVATED LIVER ENZYMES: ICD-10-CM

## 2022-08-18 DIAGNOSIS — R07.89 OTHER CHEST PAIN: ICD-10-CM

## 2022-08-18 DIAGNOSIS — K76.0 FATTY LIVER: ICD-10-CM

## 2022-08-18 DIAGNOSIS — E78.5 HYPERLIPIDEMIA, UNSPECIFIED HYPERLIPIDEMIA TYPE: ICD-10-CM

## 2022-08-18 DIAGNOSIS — Z23 NEED FOR HEPATITIS A VACCINATION: ICD-10-CM

## 2022-08-18 PROCEDURE — 3044F PR MOST RECENT HEMOGLOBIN A1C LEVEL <7.0%: ICD-10-PCS | Mod: CPTII,,, | Performed by: NURSE PRACTITIONER

## 2022-08-18 PROCEDURE — 99214 OFFICE O/P EST MOD 30 MIN: CPT | Mod: S$PBB,,, | Performed by: NURSE PRACTITIONER

## 2022-08-18 PROCEDURE — 91200 LIVER ELASTOGRAPHY: CPT | Mod: 26,S$PBB,, | Performed by: NURSE PRACTITIONER

## 2022-08-18 PROCEDURE — 1160F PR REVIEW ALL MEDS BY PRESCRIBER/CLIN PHARMACIST DOCUMENTED: ICD-10-PCS | Mod: CPTII,,, | Performed by: NURSE PRACTITIONER

## 2022-08-18 PROCEDURE — 4010F ACE/ARB THERAPY RXD/TAKEN: CPT | Mod: CPTII,,, | Performed by: NURSE PRACTITIONER

## 2022-08-18 PROCEDURE — 3008F BODY MASS INDEX DOCD: CPT | Mod: CPTII,,, | Performed by: NURSE PRACTITIONER

## 2022-08-18 PROCEDURE — 1160F RVW MEDS BY RX/DR IN RCRD: CPT | Mod: CPTII,,, | Performed by: NURSE PRACTITIONER

## 2022-08-18 PROCEDURE — 4010F PR ACE/ARB THEARPY RXD/TAKEN: ICD-10-PCS | Mod: CPTII,,, | Performed by: NURSE PRACTITIONER

## 2022-08-18 PROCEDURE — 3077F PR MOST RECENT SYSTOLIC BLOOD PRESSURE >= 140 MM HG: ICD-10-PCS | Mod: CPTII,,, | Performed by: NURSE PRACTITIONER

## 2022-08-18 PROCEDURE — 3077F SYST BP >= 140 MM HG: CPT | Mod: CPTII,,, | Performed by: NURSE PRACTITIONER

## 2022-08-18 PROCEDURE — 91200 LIVER ELASTOGRAPHY: CPT | Mod: PBBFAC | Performed by: NURSE PRACTITIONER

## 2022-08-18 PROCEDURE — 3044F HG A1C LEVEL LT 7.0%: CPT | Mod: CPTII,,, | Performed by: NURSE PRACTITIONER

## 2022-08-18 PROCEDURE — 99999 PR PBB SHADOW E&M-EST. PATIENT-LVL V: CPT | Mod: PBBFAC,,, | Performed by: NURSE PRACTITIONER

## 2022-08-18 PROCEDURE — 1159F PR MEDICATION LIST DOCUMENTED IN MEDICAL RECORD: ICD-10-PCS | Mod: CPTII,,, | Performed by: NURSE PRACTITIONER

## 2022-08-18 PROCEDURE — 3080F PR MOST RECENT DIASTOLIC BLOOD PRESSURE >= 90 MM HG: ICD-10-PCS | Mod: CPTII,,, | Performed by: NURSE PRACTITIONER

## 2022-08-18 PROCEDURE — 3080F DIAST BP >= 90 MM HG: CPT | Mod: CPTII,,, | Performed by: NURSE PRACTITIONER

## 2022-08-18 PROCEDURE — 99214 PR OFFICE/OUTPT VISIT, EST, LEVL IV, 30-39 MIN: ICD-10-PCS | Mod: S$PBB,,, | Performed by: NURSE PRACTITIONER

## 2022-08-18 PROCEDURE — 99999 PR PBB SHADOW E&M-EST. PATIENT-LVL V: ICD-10-PCS | Mod: PBBFAC,,, | Performed by: NURSE PRACTITIONER

## 2022-08-18 PROCEDURE — 3008F PR BODY MASS INDEX (BMI) DOCUMENTED: ICD-10-PCS | Mod: CPTII,,, | Performed by: NURSE PRACTITIONER

## 2022-08-18 PROCEDURE — 91200 FIBROSCAN (VIBRATION CONTROLLED TRANSIENT ELASTOGRAPHY): ICD-10-PCS | Mod: 26,S$PBB,, | Performed by: NURSE PRACTITIONER

## 2022-08-18 PROCEDURE — 99215 OFFICE O/P EST HI 40 MIN: CPT | Mod: PBBFAC | Performed by: NURSE PRACTITIONER

## 2022-08-18 PROCEDURE — 1159F MED LIST DOCD IN RCRD: CPT | Mod: CPTII,,, | Performed by: NURSE PRACTITIONER

## 2022-08-18 NOTE — PROGRESS NOTES
[facility] VISIT NEW PT NOTE    PCP: Jasen Francois NP     CHIEF COMPLAINT: fatty liver, elevated liver enzymes, + BECKY    HPI: This is a 32 y.o. White male with PMH noted below, presenting for evaluation of  fatty liver disease     2 maternal aunts  of cirrhosis in [id number] (both due to NAFLD per mother)    Serological workup was negative for Estuardo's, alpha-1 antitrypsin deficiency, hemochromatosis,  and viral hepatitis C - will screen for Hep B  + BECKY 1:160, normal ASMA and IgG  PETH ~100  2022      Prior serologic workup:   Lab Results   Component Value Date    SMOOTHMUSCAB Negative 1:40 2022    AMAIFA Negative 1:40 2022    IGGSERUM 1039 2022    ANASCREEN Positive (A) 2022    FERRITIN 159 2022    FESATURATED 21 2022    CERULOPLSM 23.0 2022    HEPCAB Negative 2021     Risk factors for fatty liver include intermittent alcohol use, obesity, HTN, HLD    Liver fibrosis staging:  -- fibroscan 2022 noted F0, S2 (kPA 5.8, )    Interval HPI: Presents today with mom. Intermittent alcohol use. No dietary restrictions at this time. Plans for nerve conduction testing for right hand/arm  + BECKY mildly positive    Labs done 2022 show elevated transaminase levels (ALT > AST, fluctuating elevation since )  Platelets WNL, alk phos WNL  Synthetic liver functioning WNL    Lab Results   Component Value Date    ALT 61 (H) 2022    AST 28 2022    ALKPHOS 52 (L) 2022    BILITOT 0.3 2022    ALBUMIN 4.5 2022     2021     Abd U/S done 2022 showed fatty liver, focal fatty sparing, no splenomegaly     + family history of liver disease: 2 aunts with cirrhosis, NAFLD + intermittent Alcohol consumption, see below  Social History     Substance and Sexual Activity   Alcohol Use Yes    Comment: no daily use, ~ 1-4 servings weekly       +Immunity to Hep B - needs Hep A vaccine, sent to [facility] pharm and ID       Allergy and  "medication list reviewed and updated     PMHX:  has a past medical history of Anxiety, Cervical spondylosis with radiculopathy, Depression, History of psychiatric hospitalization, HLD (hyperlipidemia), HTN (hypertension), psychiatric care, Hypertension, Elayne, Psychiatric problem, Suicide attempt, Suicide attempt by substance overdose (6/25/2017), and Therapy.    PSHX:  has no past surgical history on file.    FAMILY HISTORY: Updated and reviewed in [facility]    SOCIAL HISTORY:   Social History     Substance and Sexual Activity   Alcohol Use Yes    Comment: no daily use, ~ 1-4 servings weekly       Social History     Substance and Sexual Activity   Drug Use Not Currently    Types: Marijuana       ROS:   GENERAL: Denies fatigue  CARDIOVASCULAR: Denies edema  GI: Denies abdominal pain  SKIN: Denies rash, itching   NEURO: Denies confusion, memory loss, or mood changes    PHYSICAL EXAM:   Friendly White male, in no acute distress; alert and oriented to person, place and time  VITALS: BP (!) 160/95 (BP Location: Left arm, Patient Position: Sitting, BP Method: Medium (Automatic))   Pulse 98   Temp 98.3 °F (36.8 °C) (Oral)   Resp 18   Ht 5' 4" (1.626 m)   Wt 78.1 kg (172 lb 2.9 oz)   SpO2 99%   BMI 29.55 kg/m²   EYES: Sclerae anicteric  GI: Soft, non-tender, non-distended. No ascites.  EXTREMITIES:  No edema.  SKIN: Warm and dry. No jaundice. No telangectasias noted. No palmar erythema.  NEURO:  No asterixis.  PSYCH:  Thought and speech pattern appropriate. Behavior normal      EDUCATION:  See instructions discussed with patient in Instructions section of the After Visit Summary     ASSESSMENT & PLAN:  32 y.o. White male with:  1.  Fatty liver, likely related to metabolic risk factors   - see HPI  -- Immunity to Hep A and B : needs Hep A vaccine, [facility]  -- Fibroscan 8/2022 noted F0, S2 - will repeat in 6 months given positive BECKY  -- Recommendations discussed with patient:  1. Limit alcohol " consumption, none until next visit to assess if liver enzymes can improve with lifestyle changes  2 Weight loss goal of 20 lbs  3. Low carb/sugar, high fiber and protein diet, limit your carb intake to LESS than 30-45 grams of carbs with a meal or LESS than 5-10 grams with any snack   4. Exercise, 5 days per week, 30 minutes per day, as tolerated  5. Recommend good cholesterol, blood pressure, blood sugar levels   6. Consider enrolling in [facility] fibrosis clinical trails - no indication, no fibrosis     2. + BECKY  -- normal ASMA and IgG. Lower suspicion for AIH so can allow 6 months for weight loss and repeat labs. If repeat labs worsen/do not normalize, will need to pursue liver biopsy to assess for AIH    3. Elevated liver enzymes  -- Serological workup was negative for Estuardo's, alpha-1 antitrypsin deficiency, hemochromatosis,  and viral hepatitis C - will screen for Hep B  + BECKY 1:160, normal ASMA and IgG  PETH ~100  7/2022    3. Overweight, HTN, HLD  -- Body mass index is 29.55 kg/m².   -- increases risk for fatty liver          Follow up in about 6 months (around 2/18/2023). with labs and fibroscan before  Orders Placed This Encounter   Procedures    FibroScan (Vibration Controlled Transient Elastography)    Hepatitis A Vaccine (Adult) (IM)    Hepatitis B Surface Antigen    Hepatic Function Panel    BECKY Screen w/Reflex    Anti-Smooth Muscle Antibody    IgG    Phosphatidylethanol (PETH)    CK    Ambulatory referral/consult to Infectious Disease Injection Dept    Ambulatory referral/consult to Cardiology        Thank you for allowing me to participate in the care of ALEJANDRO Florian    I spent a total of 30 minutes on the day of the visit.This includes face to face time and non-face to face time preparing to see the patient (eg, review of tests), obtaining and/or reviewing separately obtained history, documenting clinical information in the electronic or other health record,  independently interpreting results and communicating results to the patient/family/caregiver, and coordinating care.         CC'ed note to:

## 2022-08-18 NOTE — PATIENT INSTRUCTIONS
1. Fibroscan to look for fat or scar tissue in the liver showed ~50% fat in the liver, no scar tissue damage   2. Recommend vaccines for Hepatitis  A, see below   3. Follow up in 6 months with labs a few days before, fibroscan same day     There is no FDA approved therapy for fatty liver disease. Therefore, these things are important:  Limit alcohol consumption, none until follow up (or max once weekly)  2 Weight loss goal of 20 lbs, referral for [facility] if interested. Also, if interested in a dietician visit to create a weight loss plan, contact the dietician team at [facility] at [email or web address] to schedule a visit to you can call [facility] in [place]: [phone] and the  will transfer the call to one of the dieticians to schedule an appointment. Or you can also call [phone] to schedule. They do offer video visits   3. Low carb/sugar, high fiber and protein diet.Try to limit your carb intake to LESS than 30-45 grams of carbs with a meal or LESS than 5-10 grams with any snack (total of any snack foods eaten during that time). Use PicLyf Pal or Lose It celi to add up your carbs through the day. Do NOT drink any beverages with calories or carbs (this can lead to high blood sugar and weight gain). Also, some of our patients have been very successful with weight loss using the pre made/planned meal planning services that limit calories and portion size (one company in [place] is called [employer] but there are also many others out there. The main thing to look for is low calorie, high protein, low carb)  4. Exercise, 5 days per week, 30 minutes per day, as tolerated  5. Recommend good cholesterol, blood pressure, blood sugar levels     In some people, fatty liver can progress to steatohepatitis (inflamatory fatty liver) and possibly to cirrhosis, increasing the risk for liver cancer, liver failure, and death. Therefore, the lifestyle changes are very  important to decrease this risk.     Website with information about fatty liver and inflammation related to fatty liver ([facility]) = www.nashtruth.com  AND [email or web address].com        HEP A VACCINE  Your labs show that you DO have immunity against Hep B (+ Hep B surface antibody), so no further vaccine needed for Hep B    However, your immunity markers show that you do NOT have immunity against Hepatitis A, so I recommend that you receive the Hepatitis A vaccine. Hep A can be transmitted through food and water and can cause significant liver injury. There was previously a significant Hepatitis A outbreak in [place]   This will protect your liver from the virus, which can make your liver very sick. The vaccine series is 2 vaccines: one now and one 6 months after the 1st one. I sent the vaccine to the [facility]. I recommend calling them in a couple of days to see if the vaccine is covered by your insurance and arrange the vaccines if they are covered. Their number is [phone].      If the vaccine is not covered at the pharmacy level, I sent an order that you can get the vaccine in the infectious disease department at [facility]. If that is the case, please call [phone]  to schedule your vaccine administration appointment in the infectious disease department.  If you need to proceed with vaccines with the infectious disease department, you can call to obtain a cost for these vaccines before you proceed, you can call the [facility] office at [phone] or [phone]

## 2022-08-18 NOTE — PROCEDURES
FibroScan (Vibration Controlled Transient Elastography)    Date/Time: 8/18/2022 8:30 AM  Performed by: Ewa He NP  Authorized by: Ewa He NP     Diagnosis:  NAFLD    Probe:  M    Universal Protocol: Patient's identity, procedure and site were verified, confirmatory pause was performed.  Discussed procedure including risks and potential complications.  Questions answered.  Patient verbalizes understanding and wishes to proceed with VCTE.     Procedure: After providing explanations of the procedure, patient was placed in the supine position with right arm in maximum abduction to allow optimal exposure of right lateral abdomen.  Patient was briefly assessed, Testing was performed in the mid-axillary location, 50Hz Shear Wave pulses were applied and the resulting Shear Wave and Propagation Speed detected with a 3.5 MHz ultrasonic signal, using the FibroScan probe, Skin to liver capsule distance and liver parenchyma were accessed during the entire examination with the FibroScan probe, Patient was instructed to breathe normally and to abstain from sudden movements during the procedure, allowing for random measurements of liver stiffness. At least 10 Shear Waves were produced, Individual measurements of each Shear Wave were calculated.  Patient tolerated the procedure well with no complications.  Meets discharge criteria as was dismissed.  Rates pain 0 out of 10.  Patient will follow up with ordering provider to review results.      Findings  Median liver stiffness score:  5.8  CAP Reading: dB/m:  260    IQR/med %:  14  Interpretation  Fibrosis interpretation is based on medial liver stiffness - Kilopascal (kPa).    Fibrosis Stage:  F 0-1  Steatosis interpretation is based on controlled attenuation parameter - (dB/m).    Steatosis Grade:  S2

## 2023-01-06 ENCOUNTER — TELEPHONE (OUTPATIENT)
Dept: NEUROSURGERY | Facility: CLINIC | Age: 33
End: 2023-01-06
Payer: MEDICAID

## 2023-01-06 NOTE — TELEPHONE ENCOUNTER
----- Message from Guillermo Mai sent at 1/5/2023 11:27 AM CST -----  Contact: [phone]  Pt is calling to get scheduled for a appt.  Pt access tried but no appts available.  Pt would like a call back. [phone]

## 2023-01-06 NOTE — TELEPHONE ENCOUNTER
Left message on pt vm stating that he will need to follow up with [facility] for treatment due to no medicaid slots being available.

## 2023-01-07 ENCOUNTER — HOSPITAL ENCOUNTER (EMERGENCY)
Facility: HOSPITAL | Age: 33
Discharge: HOME | End: 2023-01-07
Attending: EMERGENCY MEDICINE
Payer: MEDICAID

## 2023-01-07 VITALS
SYSTOLIC BLOOD PRESSURE: 158 MMHG | BODY MASS INDEX: 29.88 KG/M2 | WEIGHT: 175 LBS | OXYGEN SATURATION: 98 % | TEMPERATURE: 99 F | DIASTOLIC BLOOD PRESSURE: 98 MMHG | RESPIRATION RATE: 16 BRPM | HEART RATE: 94 BPM | HEIGHT: 64 IN

## 2023-01-07 DIAGNOSIS — M54.12 CERVICAL RADICULOPATHY: Primary | ICD-10-CM

## 2023-01-07 PROCEDURE — 99284 PR EMERGENCY DEPT VISIT,LEVEL IV: ICD-10-PCS | Mod: ,,, | Performed by: EMERGENCY MEDICINE

## 2023-01-07 PROCEDURE — 99284 EMERGENCY DEPT VISIT MOD MDM: CPT | Mod: ,,, | Performed by: EMERGENCY MEDICINE

## 2023-01-07 PROCEDURE — 96372 THER/PROPH/DIAG INJ SC/IM: CPT | Performed by: EMERGENCY MEDICINE

## 2023-01-07 PROCEDURE — 99284 EMERGENCY DEPT VISIT MOD MDM: CPT

## 2023-01-07 PROCEDURE — 63600175 PHARM REV CODE 636 W HCPCS: Performed by: EMERGENCY MEDICINE

## 2023-01-07 RX ORDER — TRIAMCINOLONE ACETONIDE 40 MG/ML
40 INJECTION, SUSPENSION INTRA-ARTICULAR; INTRAMUSCULAR
Status: COMPLETED | OUTPATIENT
Start: 2023-01-07 | End: 2023-01-07

## 2023-01-07 RX ORDER — PREDNISONE 20 MG/1
40 TABLET ORAL DAILY
Qty: 10 TABLET | Refills: 0 | Status: SHIPPED | OUTPATIENT
Start: 2023-01-08 | End: 2023-01-13

## 2023-01-07 RX ADMIN — TRIAMCINOLONE ACETONIDE 40 MG: 200 INJECTION, SUSPENSION INTRA-ARTICULAR; INTRAMUSCULAR at 07:01

## 2023-01-08 NOTE — ED PROVIDER NOTES
"SCRIBE #1 NOTE: I, Rocio Rushing, am scribing for, and in the presence of,  Humberto Traore MD. I have scribed the following portions of the note - Other sections scribed: HPI, PE.     Chief Complaint   Neck Pain (Pt reports + herniated disc, unable to sleep at night due to pain. )      History Of Present Illness   Matthew Austin is a 32 y.o. male presenting with constant back and shoulder pain. Patient reports having similar symptoms 2 years ago where he consulted a doctor and got an MRI done. States he has been "fine" up until  when he noticed a flare up to the pain. Pt reports having an ortho carpal tunnel surgery today 23 and was put on gabapentin but has has no relief with said medications to back pain. This is the extent of the patients medical problem at this time.     History obtained from: Patient     Review of patient's allergies indicates:  No Known Allergies    No current facility-administered medications on file prior to encounter.     Current Outpatient Medications on File Prior to Encounter   Medication Sig Dispense Refill    ALPRAZolam (XANAX) 1 MG tablet Take 1 mg by mouth as needed for Anxiety.      atorvastatin (LIPITOR) 10 MG tablet Take 10 mg by mouth once daily.      azelastine (ASTELIN) 137 mcg (0.1 %) nasal spray 1 spray by Nasal route 2 (two) times daily.      baclofen (LIORESAL) 20 MG tablet Take 20 mg by mouth 3 (three) times daily.      buPROPion (WELLBUTRIN XL) 300 MG 24 hr tablet SMARTSI Tablet(s) By Mouth Every Morning      cloNIDine (CATAPRES) 0.1 MG tablet clonidine HCl 0.1 mg tablet   TAKE 1 TABLET BY MOUTH TWICE DAILY      dextroamphetamine-amphetamine 30 mg Tab TAKE 1 TABLET BY MOUTH TWICE DAILY      famotidine (PEPCID) 40 MG tablet SMARTSI Tablet(s) By Mouth Daily      fluticasone propionate (FLONASE) 50 mcg/actuation nasal spray 1 spray by Each Nostril route once daily.      gabapentin (NEURONTIN) 300 MG capsule gabapentin 300 mg capsule   TAKE 1 CAPSULE BY " "MOUTH TWICE DAILY      hydroCHLOROthiazide (HYDRODIURIL) 25 MG tablet Take 25 mg by mouth once daily.      LINZESS 145 mcg Cap capsule Take 145 mcg by mouth once daily.      losartan (COZAAR) 100 MG tablet losartan 100 mg tablet      nicotine (NICODERM CQ) 7 mg/24 hr Place 1 patch onto the skin once daily. (Patient not taking: No sig reported) 30 patch 0    olmesartan medoxomil (OLMESARTAN ORAL) Take by mouth once daily.      pimecrolimus (ELIDEL) 1 % cream Apply topically 2 (two) times daily.      ramelteon (ROZEREM) 8 mg tablet Take 1 tablet (8 mg total) by mouth nightly as needed for Insomnia. (Patient not taking: Reported on 7/28/2022) 30 tablet 3       Past History   As per HPI and below:  Past Medical History:   Diagnosis Date    Anxiety     Cervical spondylosis with radiculopathy     Depression     History of psychiatric hospitalization     HLD (hyperlipidemia)     HTN (hypertension)     Hx of psychiatric care     Hypertension     Elayne     Psychiatric problem     Suicide attempt     Suicide attempt by substance overdose 6/25/2017    Therapy      No past surgical history on file.    Social History     Socioeconomic History    Marital status: Single   Tobacco Use    Smoking status: Former     Packs/day: 1.00     Years: 3.00     Pack years: 3.00     Types: Cigarettes    Smokeless tobacco: Never   Substance and Sexual Activity    Alcohol use: Yes     Comment: no daily use, ~ 1-4 servings weekly    Drug use: Not Currently     Types: Marijuana    Sexual activity: Yes     Partners: Male       No family history on file.    Physical Exam     Vitals:    01/07/23 1812   BP: (!) 158/98   Pulse: 94   Resp: 16   Temp: 98.5 °F (36.9 °C)   TempSrc: Oral   SpO2: 98%   Weight: 79.4 kg (175 lb)   Height: 5' 4" (1.626 m)     Appearance: No acute distress.  Skin: No rashes seen.  Good turgor.  No abrasions.  No ecchymoses.  Eyes: No conjunctival injection.  ENT: Oropharynx clear.    Chest: Clear to auscultation bilaterally.  " Good air movement.  No wheezes.  No rhonchi.  Cardiovascular: Regular rate and rhythm.  No murmurs. No gallops. No rubs.  Abdomen: Soft.  Not distended.  Nontender.  No guarding.  No rebound.  Musculoskeletal: Good range of motion all joints.  No deformities.  Neck supple.  No meningismus.  Neurologic: Motor intact.  Sensation intact.  Cerebellar intact.  Cranial nerves intact.  Mental Status:  Alert and oriented x 3.  Appropriate, conversant.      Medications Given     Medications   triamcinolone acetonide injection 40 mg (40 mg Intramuscular Given 1/7/23 1949)       Results and Course     Labs Reviewed   HIV 1 / 2 ANTIBODY   HEPATITIS C ANTIBODY       Imaging Results    None                  MDM, Impression and Plan   32 y.o. male with acute flare-up of chronic cervical radiculopathy on the left side.  He is already on pain medication and multiple other medicines for these issues.  He is here to get some steroids.  No bowel or bladder incontinence, no fever, no IVDA.  Highly doubt epidural abscess or hematoma.  No red flags for imaging in the ED.  He had carpal tunnel surgery done today and has pain medicine from that.  I will give him some steroids for short course and refer him back to his neurosurgeon (Dr. Goncalves).         Final diagnoses:  [M54.12] Cervical radiculopathy (Primary)        ED Disposition Condition    Discharge Stable          ED Prescriptions       Medication Sig Dispense Start Date End Date Auth. Provider    predniSONE (DELTASONE) 20 MG tablet Take 2 tablets (40 mg total) by mouth once daily. for 5 days 10 tablet 1/8/2023 1/13/2023 Humberto Traore MD          Follow-up Information       Follow up With Specialties Details Why Contact Info Additional Information    [facility] 8th Fl Neurosurgery Call   [id number]  [facility] [address] [phone] 8th Floor [facility] [place] Please park in [facility]. Check in desk is located in the lobby. Please take the C elevator  to 8th floor which opens to the lobby.               Humberto Traore MD  01/07/23 2035

## 2023-02-07 ENCOUNTER — TELEPHONE (OUTPATIENT)
Dept: HEPATOLOGY | Facility: CLINIC | Age: 33
End: 2023-02-07
Payer: MEDICAID

## 2023-02-07 NOTE — TELEPHONE ENCOUNTER
----- Message from Margi Connor MA sent at 2/6/2023  4:56 PM CST -----  Regarding: FW: reschedule appts    ----- Message -----  From: Karen Reyes  Sent: 2/6/2023   2:56 PM CST  To: [facility] Hepatology Scheduling  Subject: reschedule appts                                 Patient's mom, Frances, calling to reschedule lab and fibro scan to coincide with appt on 3/16/2023. I was unable schedule that appt in [facility].    Call: [phone] or NormanFrances (Mother)   [phone] (Mobile

## 2023-02-24 ENCOUNTER — PATIENT MESSAGE (OUTPATIENT)
Dept: RESEARCH | Facility: HOSPITAL | Age: 33
End: 2023-02-24
Payer: MEDICAID

## 2023-03-17 ENCOUNTER — PATIENT MESSAGE (OUTPATIENT)
Dept: RESEARCH | Facility: HOSPITAL | Age: 33
End: 2023-03-17
Payer: MEDICAID

## 2023-07-14 PROCEDURE — 99285 EMERGENCY DEPT VISIT HI MDM: CPT | Mod: 25

## 2023-07-14 PROCEDURE — 96375 TX/PRO/DX INJ NEW DRUG ADDON: CPT

## 2023-07-14 PROCEDURE — 96374 THER/PROPH/DIAG INJ IV PUSH: CPT

## 2023-07-15 ENCOUNTER — HOSPITAL ENCOUNTER (OUTPATIENT)
Facility: HOSPITAL | Age: 33
Discharge: HOME | End: 2023-07-17
Attending: EMERGENCY MEDICINE | Admitting: EMERGENCY MEDICINE
Payer: MEDICAID

## 2023-07-15 DIAGNOSIS — R07.9 CHEST PAIN: ICD-10-CM

## 2023-07-15 DIAGNOSIS — R10.9 ABDOMINAL PAIN, UNSPECIFIED ABDOMINAL LOCATION: ICD-10-CM

## 2023-07-15 DIAGNOSIS — I88.0 MESENTERIC ADENITIS: Primary | ICD-10-CM

## 2023-07-15 PROBLEM — F33.41 RECURRENT MAJOR DEPRESSIVE DISORDER, IN PARTIAL REMISSION: Status: ACTIVE | Noted: 2023-07-15

## 2023-07-15 PROBLEM — R10.31 RIGHT LOWER QUADRANT ABDOMINAL PAIN: Status: ACTIVE | Noted: 2023-07-15

## 2023-07-15 PROBLEM — I10 ESSENTIAL HYPERTENSION: Status: ACTIVE | Noted: 2023-07-15

## 2023-07-15 PROBLEM — R16.2 HEPATOSPLENOMEGALY: Status: ACTIVE | Noted: 2023-07-15

## 2023-07-15 PROBLEM — E78.2 MIXED HYPERLIPIDEMIA: Status: ACTIVE | Noted: 2021-01-21

## 2023-07-15 PROBLEM — K21.9 GASTROESOPHAGEAL REFLUX DISEASE WITHOUT ESOPHAGITIS: Status: ACTIVE | Noted: 2023-07-15

## 2023-07-15 LAB
ALBUMIN SERPL BCP-MCNC: 3.5 G/DL (ref 3.5–5.2)
ALP SERPL-CCNC: 69 U/L (ref 55–135)
ALT SERPL W/O P-5'-P-CCNC: 29 U/L (ref 10–44)
AMPHET+METHAMPHET UR QL: ABNORMAL
ANION GAP SERPL CALC-SCNC: 8 MMOL/L (ref 8–16)
AST SERPL-CCNC: 20 U/L (ref 10–40)
BACTERIA #/AREA URNS AUTO: NORMAL /HPF
BARBITURATES UR QL SCN>200 NG/ML: NEGATIVE
BASOPHILS # BLD AUTO: 0.04 K/UL (ref 0–0.2)
BASOPHILS NFR BLD: 0.3 % (ref 0–1.9)
BENZODIAZ UR QL SCN>200 NG/ML: ABNORMAL
BILIRUB SERPL-MCNC: 0.3 MG/DL (ref 0.1–1)
BILIRUB UR QL STRIP: NEGATIVE
BUN SERPL-MCNC: 17 MG/DL (ref 6–20)
BUN SERPL-MCNC: 20 MG/DL (ref 6–30)
BZE UR QL SCN: NEGATIVE
CALCIUM SERPL-MCNC: 8.9 MG/DL (ref 8.7–10.5)
CANNABINOIDS UR QL SCN: NEGATIVE
CHLORIDE SERPL-SCNC: 104 MMOL/L (ref 95–110)
CHLORIDE SERPL-SCNC: 97 MMOL/L (ref 95–110)
CLARITY UR REFRACT.AUTO: CLEAR
CO2 SERPL-SCNC: 26 MMOL/L (ref 23–29)
COLOR UR AUTO: YELLOW
CREAT SERPL-MCNC: 0.9 MG/DL (ref 0.5–1.4)
CREAT SERPL-MCNC: 1 MG/DL (ref 0.5–1.4)
CREAT UR-MCNC: 128 MG/DL (ref 23–375)
DIFFERENTIAL METHOD: ABNORMAL
EOSINOPHIL # BLD AUTO: 0.3 K/UL (ref 0–0.5)
EOSINOPHIL NFR BLD: 1.7 % (ref 0–8)
ERYTHROCYTE [DISTWIDTH] IN BLOOD BY AUTOMATED COUNT: 12.7 % (ref 11.5–14.5)
EST. GFR  (NO RACE VARIABLE): >60 ML/MIN/1.73 M^2
ETHANOL SERPL-MCNC: <10 MG/DL
ETHANOL UR-MCNC: <10 MG/DL
GLUCOSE SERPL-MCNC: 85 MG/DL (ref 70–110)
GLUCOSE SERPL-MCNC: 86 MG/DL (ref 70–110)
GLUCOSE UR QL STRIP: NEGATIVE
HCT VFR BLD AUTO: 47.8 % (ref 40–54)
HCT VFR BLD CALC: 46 %PCV (ref 36–54)
HCV AB SERPL QL IA: NORMAL
HGB BLD-MCNC: 16.3 G/DL (ref 14–18)
HGB UR QL STRIP: NEGATIVE
HIV 1+2 AB+HIV1 P24 AG SERPL QL IA: NORMAL
HYALINE CASTS UR QL AUTO: 0 /LPF
IMM GRANULOCYTES # BLD AUTO: 0.05 K/UL (ref 0–0.04)
IMM GRANULOCYTES NFR BLD AUTO: 0.3 % (ref 0–0.5)
KETONES UR QL STRIP: NEGATIVE
LEUKOCYTE ESTERASE UR QL STRIP: NEGATIVE
LIPASE SERPL-CCNC: 211 U/L (ref 4–60)
LYMPHOCYTES # BLD AUTO: 2.9 K/UL (ref 1–4.8)
LYMPHOCYTES NFR BLD: 19.4 % (ref 18–48)
MCH RBC QN AUTO: 31.5 PG (ref 27–31)
MCHC RBC AUTO-ENTMCNC: 34.1 G/DL (ref 32–36)
MCV RBC AUTO: 92 FL (ref 82–98)
METHADONE UR QL SCN>300 NG/ML: NEGATIVE
MICROSCOPIC COMMENT: NORMAL
MONOCYTES # BLD AUTO: 1.4 K/UL (ref 0.3–1)
MONOCYTES NFR BLD: 9.4 % (ref 4–15)
NEUTROPHILS # BLD AUTO: 10.3 K/UL (ref 1.8–7.7)
NEUTROPHILS NFR BLD: 68.9 % (ref 38–73)
NITRITE UR QL STRIP: NEGATIVE
NRBC BLD-RTO: 0 /100 WBC
OPIATES UR QL SCN: ABNORMAL
PCP UR QL SCN>25 NG/ML: NEGATIVE
PH UR STRIP: 7 [PH] (ref 5–8)
PLATELET # BLD AUTO: 306 K/UL (ref 150–450)
PMV BLD AUTO: 9.4 FL (ref 9.2–12.9)
POC IONIZED CALCIUM: 1.25 MMOL/L (ref 1.06–1.42)
POC TCO2 (MEASURED): 27 MMOL/L (ref 23–29)
POTASSIUM BLD-SCNC: 3.9 MMOL/L (ref 3.5–5.1)
POTASSIUM SERPL-SCNC: 3.8 MMOL/L (ref 3.5–5.1)
PROT SERPL-MCNC: 6.5 G/DL (ref 6–8.4)
PROT UR QL STRIP: ABNORMAL
RBC # BLD AUTO: 5.18 M/UL (ref 4.6–6.2)
RBC #/AREA URNS AUTO: 1 /HPF (ref 0–4)
SAMPLE: NORMAL
SODIUM BLD-SCNC: 139 MMOL/L (ref 136–145)
SODIUM SERPL-SCNC: 138 MMOL/L (ref 136–145)
SP GR UR STRIP: >1.03 (ref 1–1.03)
TOXICOLOGY INFORMATION: ABNORMAL
URN SPEC COLLECT METH UR: ABNORMAL
WBC # BLD AUTO: 14.97 K/UL (ref 3.9–12.7)
WBC #/AREA URNS AUTO: 1 /HPF (ref 0–5)

## 2023-07-15 PROCEDURE — 25500020 PHARM REV CODE 255: Performed by: EMERGENCY MEDICINE

## 2023-07-15 PROCEDURE — 63600175 PHARM REV CODE 636 W HCPCS: Performed by: STUDENT IN AN ORGANIZED HEALTH CARE EDUCATION/TRAINING PROGRAM

## 2023-07-15 PROCEDURE — 86803 HEPATITIS C AB TEST: CPT | Performed by: PHYSICIAN ASSISTANT

## 2023-07-15 PROCEDURE — 96361 HYDRATE IV INFUSION ADD-ON: CPT

## 2023-07-15 PROCEDURE — A4216 STERILE WATER/SALINE, 10 ML: HCPCS | Performed by: STUDENT IN AN ORGANIZED HEALTH CARE EDUCATION/TRAINING PROGRAM

## 2023-07-15 PROCEDURE — 25000003 PHARM REV CODE 250: Performed by: STUDENT IN AN ORGANIZED HEALTH CARE EDUCATION/TRAINING PROGRAM

## 2023-07-15 PROCEDURE — 87389 HIV-1 AG W/HIV-1&-2 AB AG IA: CPT | Performed by: PHYSICIAN ASSISTANT

## 2023-07-15 PROCEDURE — 82077 ASSAY SPEC XCP UR&BREATH IA: CPT | Performed by: STUDENT IN AN ORGANIZED HEALTH CARE EDUCATION/TRAINING PROGRAM

## 2023-07-15 PROCEDURE — 96372 THER/PROPH/DIAG INJ SC/IM: CPT | Mod: 59 | Performed by: STUDENT IN AN ORGANIZED HEALTH CARE EDUCATION/TRAINING PROGRAM

## 2023-07-15 PROCEDURE — 81001 URINALYSIS AUTO W/SCOPE: CPT | Mod: 59

## 2023-07-15 PROCEDURE — 80053 COMPREHEN METABOLIC PANEL: CPT | Performed by: EMERGENCY MEDICINE

## 2023-07-15 PROCEDURE — 96376 TX/PRO/DX INJ SAME DRUG ADON: CPT

## 2023-07-15 PROCEDURE — 85025 COMPLETE CBC W/AUTO DIFF WBC: CPT

## 2023-07-15 PROCEDURE — G0378 HOSPITAL OBSERVATION PER HR: HCPCS

## 2023-07-15 PROCEDURE — 25000003 PHARM REV CODE 250

## 2023-07-15 PROCEDURE — 63600175 PHARM REV CODE 636 W HCPCS

## 2023-07-15 PROCEDURE — 99223 1ST HOSP IP/OBS HIGH 75: CPT | Mod: ,,, | Performed by: STUDENT IN AN ORGANIZED HEALTH CARE EDUCATION/TRAINING PROGRAM

## 2023-07-15 PROCEDURE — 87801 DETECT AGNT MULT DNA AMPLI: CPT

## 2023-07-15 PROCEDURE — 99223 PR INITIAL HOSPITAL CARE,LEVL III: ICD-10-PCS | Mod: ,,, | Performed by: STUDENT IN AN ORGANIZED HEALTH CARE EDUCATION/TRAINING PROGRAM

## 2023-07-15 PROCEDURE — 83690 ASSAY OF LIPASE: CPT | Performed by: EMERGENCY MEDICINE

## 2023-07-15 PROCEDURE — 86611 BARTONELLA ANTIBODY: CPT | Mod: 91 | Performed by: PHYSICIAN ASSISTANT

## 2023-07-15 PROCEDURE — 80307 DRUG TEST PRSMV CHEM ANLYZR: CPT | Performed by: STUDENT IN AN ORGANIZED HEALTH CARE EDUCATION/TRAINING PROGRAM

## 2023-07-15 RX ORDER — AMLODIPINE BESYLATE 5 MG/1
5 TABLET ORAL DAILY
COMMUNITY
Start: 2023-07-08

## 2023-07-15 RX ORDER — TALC
6 POWDER (GRAM) TOPICAL NIGHTLY PRN
Status: DISCONTINUED | OUTPATIENT
Start: 2023-07-15 | End: 2023-07-17 | Stop reason: HOSPADM

## 2023-07-15 RX ORDER — MORPHINE SULFATE 2 MG/ML
2 INJECTION, SOLUTION INTRAMUSCULAR; INTRAVENOUS
Status: COMPLETED | OUTPATIENT
Start: 2023-07-15 | End: 2023-07-15

## 2023-07-15 RX ORDER — BUPROPION HYDROCHLORIDE 300 MG/1
300 TABLET ORAL EVERY MORNING
Status: DISCONTINUED | OUTPATIENT
Start: 2023-07-15 | End: 2023-07-17 | Stop reason: HOSPADM

## 2023-07-15 RX ORDER — ACETAMINOPHEN 325 MG/1
650 TABLET ORAL EVERY 4 HOURS PRN
Status: DISCONTINUED | OUTPATIENT
Start: 2023-07-15 | End: 2023-07-17 | Stop reason: HOSPADM

## 2023-07-15 RX ORDER — SODIUM CHLORIDE, SODIUM LACTATE, POTASSIUM CHLORIDE, CALCIUM CHLORIDE 600; 310; 30; 20 MG/100ML; MG/100ML; MG/100ML; MG/100ML
INJECTION, SOLUTION INTRAVENOUS CONTINUOUS
Status: DISCONTINUED | OUTPATIENT
Start: 2023-07-15 | End: 2023-07-17 | Stop reason: HOSPADM

## 2023-07-15 RX ORDER — NALOXONE HCL 0.4 MG/ML
0.02 VIAL (ML) INJECTION
Status: DISCONTINUED | OUTPATIENT
Start: 2023-07-15 | End: 2023-07-17 | Stop reason: HOSPADM

## 2023-07-15 RX ORDER — ONDANSETRON 2 MG/ML
4 INJECTION INTRAMUSCULAR; INTRAVENOUS EVERY 8 HOURS PRN
Status: DISCONTINUED | OUTPATIENT
Start: 2023-07-15 | End: 2023-07-17 | Stop reason: HOSPADM

## 2023-07-15 RX ORDER — SIMETHICONE 80 MG
1 TABLET,CHEWABLE ORAL 4 TIMES DAILY PRN
Status: DISCONTINUED | OUTPATIENT
Start: 2023-07-15 | End: 2023-07-17 | Stop reason: HOSPADM

## 2023-07-15 RX ORDER — POLYETHYLENE GLYCOL 3350 17 G/17G
17 POWDER, FOR SOLUTION ORAL DAILY PRN
Status: DISCONTINUED | OUTPATIENT
Start: 2023-07-15 | End: 2023-07-17 | Stop reason: HOSPADM

## 2023-07-15 RX ORDER — MORPHINE SULFATE 4 MG/ML
4 INJECTION, SOLUTION INTRAMUSCULAR; INTRAVENOUS
Status: COMPLETED | OUTPATIENT
Start: 2023-07-15 | End: 2023-07-15

## 2023-07-15 RX ORDER — ONDANSETRON 2 MG/ML
4 INJECTION INTRAMUSCULAR; INTRAVENOUS
Status: COMPLETED | OUTPATIENT
Start: 2023-07-15 | End: 2023-07-15

## 2023-07-15 RX ORDER — LORATADINE 10 MG/1
10 TABLET ORAL DAILY PRN
COMMUNITY

## 2023-07-15 RX ORDER — GABAPENTIN 300 MG/1
300 CAPSULE ORAL 2 TIMES DAILY
Status: DISCONTINUED | OUTPATIENT
Start: 2023-07-15 | End: 2023-07-17 | Stop reason: HOSPADM

## 2023-07-15 RX ORDER — FAMOTIDINE 20 MG/1
40 TABLET, FILM COATED ORAL DAILY
Status: DISCONTINUED | OUTPATIENT
Start: 2023-07-16 | End: 2023-07-17 | Stop reason: HOSPADM

## 2023-07-15 RX ORDER — ATORVASTATIN CALCIUM 10 MG/1
10 TABLET, FILM COATED ORAL DAILY
Status: DISCONTINUED | OUTPATIENT
Start: 2023-07-15 | End: 2023-07-17 | Stop reason: HOSPADM

## 2023-07-15 RX ORDER — MELOXICAM 15 MG/1
15 TABLET ORAL DAILY PRN
Status: ON HOLD | COMMUNITY
Start: 2023-06-27 | End: 2023-07-17 | Stop reason: HOSPADM

## 2023-07-15 RX ORDER — MORPHINE SULFATE 2 MG/ML
2 INJECTION, SOLUTION INTRAMUSCULAR; INTRAVENOUS EVERY 6 HOURS PRN
Status: DISCONTINUED | OUTPATIENT
Start: 2023-07-15 | End: 2023-07-15

## 2023-07-15 RX ORDER — MORPHINE SULFATE 4 MG/ML
2 INJECTION, SOLUTION INTRAMUSCULAR; INTRAVENOUS EVERY 6 HOURS PRN
Status: DISCONTINUED | OUTPATIENT
Start: 2023-07-15 | End: 2023-07-17 | Stop reason: HOSPADM

## 2023-07-15 RX ORDER — OXYCODONE HYDROCHLORIDE 10 MG/1
10 TABLET ORAL EVERY 6 HOURS PRN
Status: DISCONTINUED | OUTPATIENT
Start: 2023-07-15 | End: 2023-07-17 | Stop reason: HOSPADM

## 2023-07-15 RX ORDER — KETOROLAC TROMETHAMINE 10 MG/1
10 TABLET, FILM COATED ORAL
Status: COMPLETED | OUTPATIENT
Start: 2023-07-15 | End: 2023-07-15

## 2023-07-15 RX ORDER — PANTOPRAZOLE SODIUM 40 MG/1
40 TABLET, DELAYED RELEASE ORAL DAILY
COMMUNITY
Start: 2023-06-22

## 2023-07-15 RX ORDER — MAG HYDROX/ALUMINUM HYD/SIMETH 200-200-20
30 SUSPENSION, ORAL (FINAL DOSE FORM) ORAL 4 TIMES DAILY PRN
Status: DISCONTINUED | OUTPATIENT
Start: 2023-07-15 | End: 2023-07-17 | Stop reason: HOSPADM

## 2023-07-15 RX ORDER — IPRATROPIUM BROMIDE AND ALBUTEROL SULFATE 2.5; .5 MG/3ML; MG/3ML
3 SOLUTION RESPIRATORY (INHALATION) EVERY 6 HOURS PRN
Status: DISCONTINUED | OUTPATIENT
Start: 2023-07-15 | End: 2023-07-17 | Stop reason: HOSPADM

## 2023-07-15 RX ORDER — PREGABALIN 75 MG/1
75 CAPSULE ORAL 2 TIMES DAILY PRN
COMMUNITY
Start: 2023-06-27

## 2023-07-15 RX ORDER — CITALOPRAM 20 MG/1
20 TABLET, FILM COATED ORAL DAILY
COMMUNITY
Start: 2023-06-11

## 2023-07-15 RX ORDER — SODIUM CHLORIDE 0.9 % (FLUSH) 0.9 %
10 SYRINGE (ML) INJECTION EVERY 8 HOURS
Status: DISCONTINUED | OUTPATIENT
Start: 2023-07-15 | End: 2023-07-17 | Stop reason: HOSPADM

## 2023-07-15 RX ORDER — ATORVASTATIN CALCIUM 40 MG/1
40 TABLET, FILM COATED ORAL DAILY
COMMUNITY
Start: 2023-07-02

## 2023-07-15 RX ORDER — PROCHLORPERAZINE EDISYLATE 5 MG/ML
5 INJECTION INTRAMUSCULAR; INTRAVENOUS EVERY 6 HOURS PRN
Status: DISCONTINUED | OUTPATIENT
Start: 2023-07-15 | End: 2023-07-17 | Stop reason: HOSPADM

## 2023-07-15 RX ORDER — ENOXAPARIN SODIUM 100 MG/ML
40 INJECTION SUBCUTANEOUS EVERY 24 HOURS
Status: DISCONTINUED | OUTPATIENT
Start: 2023-07-15 | End: 2023-07-17 | Stop reason: HOSPADM

## 2023-07-15 RX ADMIN — SODIUM CHLORIDE, POTASSIUM CHLORIDE, SODIUM LACTATE AND CALCIUM CHLORIDE: 600; 310; 30; 20 INJECTION, SOLUTION INTRAVENOUS at 12:07

## 2023-07-15 RX ADMIN — MORPHINE SULFATE 2 MG: 4 INJECTION INTRAVENOUS at 09:07

## 2023-07-15 RX ADMIN — ONDANSETRON 4 MG: 2 INJECTION INTRAMUSCULAR; INTRAVENOUS at 10:07

## 2023-07-15 RX ADMIN — KETOROLAC TROMETHAMINE 10 MG: 10 TABLET, FILM COATED ORAL at 04:07

## 2023-07-15 RX ADMIN — GABAPENTIN 300 MG: 300 CAPSULE ORAL at 11:07

## 2023-07-15 RX ADMIN — OXYCODONE HYDROCHLORIDE 10 MG: 10 TABLET ORAL at 08:07

## 2023-07-15 RX ADMIN — BUPROPION HYDROCHLORIDE 300 MG: 300 TABLET, FILM COATED, EXTENDED RELEASE ORAL at 11:07

## 2023-07-15 RX ADMIN — ONDANSETRON 4 MG: 2 INJECTION INTRAMUSCULAR; INTRAVENOUS at 02:07

## 2023-07-15 RX ADMIN — MORPHINE SULFATE 4 MG: 4 INJECTION INTRAVENOUS at 04:07

## 2023-07-15 RX ADMIN — ATORVASTATIN CALCIUM 10 MG: 10 TABLET, FILM COATED ORAL at 11:07

## 2023-07-15 RX ADMIN — MORPHINE SULFATE 2 MG: 2 INJECTION, SOLUTION INTRAMUSCULAR; INTRAVENOUS at 08:07

## 2023-07-15 RX ADMIN — MORPHINE SULFATE 2 MG: 2 INJECTION, SOLUTION INTRAMUSCULAR; INTRAVENOUS at 01:07

## 2023-07-15 RX ADMIN — IOHEXOL 75 ML: 350 INJECTION, SOLUTION INTRAVENOUS at 02:07

## 2023-07-15 RX ADMIN — Medication 10 ML: at 01:07

## 2023-07-15 RX ADMIN — MORPHINE SULFATE 4 MG: 4 INJECTION INTRAVENOUS at 02:07

## 2023-07-15 RX ADMIN — SODIUM CHLORIDE, POTASSIUM CHLORIDE, SODIUM LACTATE AND CALCIUM CHLORIDE: 600; 310; 30; 20 INJECTION, SOLUTION INTRAVENOUS at 09:07

## 2023-07-15 RX ADMIN — SODIUM CHLORIDE 1000 ML: 9 INJECTION, SOLUTION INTRAVENOUS at 04:07

## 2023-07-15 RX ADMIN — ENOXAPARIN SODIUM 40 MG: 40 INJECTION SUBCUTANEOUS at 05:07

## 2023-07-15 RX ADMIN — Medication 10 ML: at 09:07

## 2023-07-15 RX ADMIN — GABAPENTIN 300 MG: 300 CAPSULE ORAL at 08:07

## 2023-07-15 NOTE — ASSESSMENT & PLAN NOTE
- Interval history and physical exam findings as described above  - CT and US findings reviewed  - Leukocytosis thought to be from reactionary inflammation, no acute signs of infection  - Unclear etiology  - IVF LR at 125cc/hr  - CLD for now, advance as tolerated  - PRN analgesics provided  - Continuing to monitor

## 2023-07-15 NOTE — DISCHARGE INSTRUCTIONS
Diagnosis: Abdominal pain    CT of your abdomen showed inflamed lymph nodes.      You do not have appendicitis.    Take ibuprofen (also called Advil, Motrin) for your pain. This medicine is available over-the-counter in 200 mg tablets.  - You may take 600 mg every 6 hours, or 800 mg every 8 hours as needed   - Do not take more than this amount, as it can cause kidney problems, bleeding in your stomach, and other serious problems.   - Do not also take naproxen (Aleve) at the same time or on the same day  - If you have heart problems or uncontrolled high blood pressure, you should not take ibuprofen for more than 3 days without discussing with your doctor    If your pain is not controlled with ibuprofen, you may also take acetaminophen (also called Tylenol).  - You may take up to 1,000 mg of Tylenol every 6 hours as needed  - Do not take more than 4,000 mg in 24 hours (1 day) as this may cause liver damage  - Many other medicines include acetaminophen (Tylenol) such as: Norco, Vicodin, Tylenol #3, many cold medicines, etc.  - Please read all labels carefully and do not combine medicines that include acetaminophen.  - If you have a history of liver disease or drink alcohol heavily, do not take acetaminophen (Tylenol) since it can damage your liver        Home Care Instructions:  - Medications: Continue taking your home medications as prescribed    Follow-Up Plan:  - Follow-up with: Primary care doctor within 3 - 5  days  - Additional testing and/or evaluation will be directed by your primary doctor    Return to the Emergency Department for symptoms including but not limited to: worsening symptoms, severe back pain, shortness of breath or chest pain, vomiting with inability to hold down fluids, blood in vomit or poop, fevers greater than 100.4°F, passing out/fainting/unconsciousness, or other concerning symptoms.

## 2023-07-15 NOTE — PHARMACY MED REC
"Admission Medication History     The home medication history was taken by Brit Junior.    You may go to "Admission" then "Reconcile Home Medications" tabs to review and/or act upon these items.     The home medication list has been updated by the Pharmacy department.   Please read ALL comments highlighted in yellow.   Please address this information as you see fit.    Feel free to contact us if you have any questions or require assistance.      The medications listed below were removed from the home medication list. Please reorder if appropriate:  Patient reports no longer taking the following medication(s):  AZELASTINE 137 MCG NASAL SPRAY  CLONIDINE 0.1 MG  FAMOTIDINE 40 MG  FLUTICASONE PROPIONATE 50 MCG/ ACTUATION NASAL SPRAY  NICOTINE 7 MG/ 24 HR  OLMESARTAN MEDOXOMIL  RAMELTEON 8 MG    Medications listed below were obtained from: Patient/family and Analytic software- [employer]  Current Outpatient Medications on File Prior to Encounter   Medication Sig    ALPRAZolam (XANAX) 1 MG tablet Take 1 mg by mouth once daily.    amLODIPine (NORVASC) 5 MG tablet Take 5 mg by mouth once daily.    atorvastatin (LIPITOR) 40 MG tablet Take 40 mg by mouth once daily.    baclofen (LIORESAL) 20 MG tablet Take 20 mg by mouth 3 (three) times daily as needed (muscle spasm).    buPROPion (WELLBUTRIN XL) 300 MG 24 hr tablet Take 300 mg by mouth once daily.    citalopram (CELEXA) 20 MG tablet Take 20 mg by mouth once daily.    dextroamphetamine-amphetamine 30 mg Tab TAKE 1 TABLET BY MOUTH TWICE DAILY    gabapentin (NEURONTIN) 300 MG capsule Take 300 mg by mouth 3 (three) times daily as needed (pain).    hydroCHLOROthiazide (HYDRODIURIL) 25 MG tablet Take 25 mg by mouth once daily.    linaCLOtide (LINZESS) 145 mcg Cap capsule Take 145 mcg by mouth daily as needed (constipation).    loratadine (CLARITIN) 10 mg tablet Take 10 mg by mouth daily as needed for Allergies.    losartan (COZAAR) 100 MG tablet Take 100 mg by mouth once daily.    " lubiprostone (AMITIZA ORAL) Take 1 tablet by mouth daily as needed (constipation).    meloxicam (MOBIC) 15 MG tablet Take 15 mg by mouth daily as needed for Pain.    pantoprazole (PROTONIX) 40 MG tablet Take 40 mg by mouth once daily.    pregabalin (LYRICA) 75 MG capsule Take 75 mg by mouth 2 (two) times daily as needed (pain).    semaglutide (OZEMPIC) 1 mg/dose (4 mg/3 mL) Inject 1 mg into the skin every Thursday.    pimecrolimus (ELIDEL) 1 % cream Apply topically 2 (two) times daily.             Brit Junior  EXT [id number]                  .

## 2023-07-15 NOTE — PLAN OF CARE
Problem: Adult Inpatient Plan of Care  Goal: Plan of Care Review  Outcome: Ongoing, Not Progressing  Goal: Absence of Hospital-Acquired Illness or Injury  Outcome: Ongoing, Not Progressing  Goal: Optimal Comfort and Wellbeing  Outcome: Ongoing, Not Progressing  Plan of care reviewed with patient. Pt verbalized understanding. Pt progressing toward goals. No distress noted. No falls or injuries during shift. Pt bed in lowest position. Side rails x2. Bed alarm activated. Call bell and personal belongs within reach. TSafety precautions maintained.

## 2023-07-15 NOTE — SUBJECTIVE & OBJECTIVE
Past Medical History:   Diagnosis Date    Anxiety     Cervical spondylosis with radiculopathy     Depression     History of psychiatric hospitalization     HLD (hyperlipidemia)     HTN (hypertension)     Hx of psychiatric care     Hypertension     Elayne     Psychiatric problem     Suicide attempt     Suicide attempt by substance overdose 2017    Therapy        No past surgical history on file.    Review of patient's allergies indicates:  No Known Allergies    No current facility-administered medications on file prior to encounter.     Current Outpatient Medications on File Prior to Encounter   Medication Sig    ALPRAZolam (XANAX) 1 MG tablet Take 1 mg by mouth as needed for Anxiety.    atorvastatin (LIPITOR) 10 MG tablet Take 10 mg by mouth once daily.    azelastine (ASTELIN) 137 mcg (0.1 %) nasal spray 1 spray by Nasal route 2 (two) times daily.    baclofen (LIORESAL) 20 MG tablet Take 20 mg by mouth 3 (three) times daily.    buPROPion (WELLBUTRIN XL) 300 MG 24 hr tablet SMARTSI Tablet(s) By Mouth Every Morning    cloNIDine (CATAPRES) 0.1 MG tablet clonidine HCl 0.1 mg tablet   TAKE 1 TABLET BY MOUTH TWICE DAILY    dextroamphetamine-amphetamine 30 mg Tab TAKE 1 TABLET BY MOUTH TWICE DAILY    famotidine (PEPCID) 40 MG tablet SMARTSI Tablet(s) By Mouth Daily    fluticasone propionate (FLONASE) 50 mcg/actuation nasal spray 1 spray by Each Nostril route once daily.    gabapentin (NEURONTIN) 300 MG capsule gabapentin 300 mg capsule   TAKE 1 CAPSULE BY MOUTH TWICE DAILY    hydroCHLOROthiazide (HYDRODIURIL) 25 MG tablet Take 25 mg by mouth once daily.    LINZESS 145 mcg Cap capsule Take 145 mcg by mouth once daily.    losartan (COZAAR) 100 MG tablet losartan 100 mg tablet    nicotine (NICODERM CQ) 7 mg/24 hr Place 1 patch onto the skin once daily. (Patient not taking: No sig reported)    olmesartan medoxomil (OLMESARTAN ORAL) Take by mouth once daily.    pimecrolimus (ELIDEL) 1 % cream Apply topically 2 (two)  times daily.    ramelteon (ROZEREM) 8 mg tablet Take 1 tablet (8 mg total) by mouth nightly as needed for Insomnia. (Patient not taking: Reported on 7/28/2022)     Family History    None       Tobacco Use    Smoking status: Former     Packs/day: 1.00     Years: 3.00     Pack years: 3.00     Types: Cigarettes    Smokeless tobacco: Never   Substance and Sexual Activity    Alcohol use: Yes     Comment: no daily use, ~ 1-4 servings weekly    Drug use: Not Currently     Types: Marijuana    Sexual activity: Yes     Partners: Male       ROS  General ROS: negative for weight loss, weight gain, fevers, chills, night sweats  ENT ROS: negative for epistaxis, headaches or sinus pain  Ophthalmic ROS: negative for blurry vision, decreased vision, double vision or itchy eyes  Cardiovascular ROS: negative for chest pain or dyspnea on exertion  Respiratory ROS: negative for cough, shortness of breath, or wheezing  Gastrointestinal ROS: See HPI  Genito-Urinary ROS: negative for dysuria, trouble voiding, or hematuria  Neurological ROS: negative for TIA or stroke symptoms  Endocrine ROS: negative for hair pattern changes or unexpected weight changes  Musculoskeletal ROS: negative for muscle pain, weakness, joint pain or joint swelling  Skin: negative for rash, wounds, skin breakdown, or issues otherwise  Hematological and Lymphatic ROS: negative for bleeding, bruising, swollen lymph nodes  Psychological ROS: negative for anxiety or depression      Objective:     Vital Signs (Most Recent):  Temp: 97.9 °F (36.6 °C) (07/15/23 0735)  Pulse: 66 (07/15/23 1034)  Resp: 18 (07/15/23 1034)  BP: 119/65 (07/15/23 1034)  SpO2: 99 % (07/15/23 1034) Vital Signs (24h Range):  Temp:  [97.9 °F (36.6 °C)-98.7 °F (37.1 °C)] 97.9 °F (36.6 °C)  Pulse:  [66-93] 66  Resp:  [16-18] 18  SpO2:  [97 %-99 %] 99 %  BP: (105-130)/(63-83) 119/65        There is no height or weight on file to calculate BMI.       Physical Exam  Gen: in NAD, appears stated age  Neuro:  AAOx4, CN2-12 grossly intact BL; motor, sensory, and strength grossly intact BL  HEENT: NTNC, EOMI, PERRLA, MMM; no thyromegaly or lymphadenopathy; no JVD appreciated  CVS: RRR, no m/r/g; S1/S2 auscultated with no S3 or S4; capillary refill < 2 sec  Resp: lungs CTAB, no w/r/r; no belabored breathing or accessory muscle use appreciated   Abd: BS+ in all 4 quadrants; TTP in RLQ, soft to palpation; no organomegaly appreciated   Extrem: pulses full, equal, and regular over all 4 extremities; no UE or LE edema BL       Significant Labs: All pertinent labs within the past 24 hours have been reviewed.    Significant Imaging: I have reviewed all pertinent imaging results/findings within the past 24 hours.

## 2023-07-15 NOTE — PROGRESS NOTES
07/15/23 1707   Vital Signs   Temp 97.7 °F (36.5 °C)   Temp Source Oral   Pulse 64   Heart Rate Source Monitor   Resp 18   SpO2 98 %   /67   MAP (mmHg) 83   BP Location Right arm   Patient Position Lying     Pt arrived to the unit. Pt AAOX4. No distress noted. LR infusing at 125 ml/hr. Bed in lowest position. Side rails up x2. Call bell and personal belongs within reach. Bed alarm activated. Safety precautions maintained. Pt free of falls or injuries. No further issues or concerns at this time. Plan of care continues.

## 2023-07-15 NOTE — ED NOTES
I-STAT Chem-8+ Results:   Value Reference Range   Sodium 139 136-145 mmol/L   Potassium  3.9 3.5-5.1 mmol/L   Chloride 97  mmol/L   Ionized Calcium 1.25 1.06-1.42 mmol/L   CO2 (measured) 27 23-29 mmol/L   Glucose 86  mg/dL   BUN 20 6-30 mg/dL   Creatinine 1.0 0.5-1.4 mg/dL   Hematocrit 46 36-54%

## 2023-07-15 NOTE — ED PROVIDER NOTES
Encounter Date: 7/14/2023       History     Chief Complaint   Patient presents with    Abdominal Pain     RLQ abominal pain since yesterday. Started mild and now is worsening. Reports constipation, last BM earlier today. Associated mild nausea, no vomiting. No urinary symptoms.      Matthew Austin is a 33 y.o. male with PMH of anxiety, hypertension, [place] [facility] [place] lower quadrant abdominal pain.  Patient states that he has had severe sharp right lower quadrant abdominal pain for approximately 1 day.  Triage note states that patient endorses constipation.  Upon my interview, patient states that he had his last bowel movement today and he has been having a bowel movement each day recently.  Patient recently had endoscopy and colonoscopy in June of this year.  Endorses nausea but no vomiting.  Denies fever, chills, urinary symptoms.      Review of patient's allergies indicates:  No Known Allergies  Past Medical History:   Diagnosis Date    Anxiety     Cervical spondylosis with radiculopathy     Depression     History of psychiatric hospitalization     HLD (hyperlipidemia)     HTN (hypertension)     Hx of psychiatric care     Hypertension     Elayne     Psychiatric problem     Suicide attempt     Suicide attempt by substance overdose 6/25/2017    Therapy      No past surgical history on file.  No family history on file.  Social History     Tobacco Use    Smoking status: Former     Packs/day: 1.00     Years: 3.00     Pack years: 3.00     Types: Cigarettes    Smokeless tobacco: Never   Substance Use Topics    Alcohol use: Yes     Comment: no daily use, ~ 1-4 servings weekly    Drug use: Not Currently     Types: Marijuana     Review of Systems   Constitutional:  Negative for fever.   HENT:  Negative for congestion, rhinorrhea and sore throat.    Eyes:  Negative for visual disturbance.   Respiratory:  Negative for cough and shortness of breath.    Cardiovascular:  Negative for chest pain and leg swelling.    Gastrointestinal:  Positive for abdominal pain. Negative for diarrhea, nausea and vomiting.   Genitourinary:  Negative for dysuria and hematuria.   Neurological:  Negative for weakness.     Physical Exam     Initial Vitals [07/14/23 2338]   BP Pulse Resp Temp SpO2   130/83 93 18 98.2 °F (36.8 °C) 97 %      MAP       --         Physical Exam    Nursing note and vitals reviewed.  Constitutional: He appears well-developed and well-nourished. He is cooperative.  Non-toxic appearance. He does not appear ill.   HENT:   Head: Normocephalic and atraumatic.   Mouth/Throat: Mucous membranes are normal. Mucous membranes are not dry.   Eyes: Conjunctivae are normal. Pupils are equal, round, and reactive to light.   Neck: Trachea normal and phonation normal.   Cardiovascular:  Normal rate, regular rhythm, normal heart sounds, intact distal pulses and normal pulses.     Exam reveals no gallop, no S3, no S4 and no friction rub.       No murmur heard.  Pulmonary/Chest: Breath sounds normal. No respiratory distress. He has no wheezes. He has no rales.   Abdominal: Abdomen is soft. He exhibits no distension. There is abdominal tenderness in the right lower quadrant. There is rebound.   Musculoskeletal:      Right lower leg: No edema.      Left lower leg: No edema.     Neurological: He is alert.   Skin: Skin is warm, dry and intact. Capillary refill takes less than 2 seconds.   Psychiatric: He has a normal mood and affect. His speech is normal.       ED Course   Procedures  Labs Reviewed   CBC W/ AUTO DIFFERENTIAL - Abnormal; Notable for the following components:       Result Value    WBC 14.97 (*)     MCH 31.5 (*)     Gran # (ANC) 10.3 (*)     Immature Grans (Abs) 0.05 (*)     Mono # 1.4 (*)     All other components within normal limits   URINALYSIS, REFLEX TO URINE CULTURE - Abnormal; Notable for the following components:    Specific Gravity, UA >1.030 (*)     Protein, UA 1+ (*)     All other components within normal limits     Narrative:     Specimen Source->Urine   HIV 1 / 2 ANTIBODY    Narrative:     Release to patient->Immediate   HEPATITIS C ANTIBODY    Narrative:     Release to patient->Immediate   BARTONELLA ANITBODY PANEL   BARTONELLA, MOLECULAR DETECTION, PCR, BLOOD   URINALYSIS MICROSCOPIC    Narrative:     Specimen Source->Urine   COMPREHENSIVE METABOLIC PANEL   LIPASE   BARTONELLA ANITBODY PANEL   BARTONELLA, MOLECULAR DETECTION, PCR, BLOOD   ISTAT PROCEDURE   ISTAT CHEM8     EKG Readings: (Independently Interpreted)   T Waves Flipped: AVR.     Imaging Results               CT Abdomen Pelvis With Contrast (Final result)  Result time 07/15/23 04:09:05      Final result by Samson Hinkle MD (07/15/23 04:09:05)                   Impression:      Right lower quadrant inflammatory lymphadenopathy as well as an adjacent enlarged lymph node along the SMA, as above.  The appendix appears within normal limits.  These findings are favored to represent mesenteric adenitis.  Suggest imaging follow-up as some of the lymph nodes measure up to 1.5 cm.    Decreased caliber of the 1st portion of the duodenum which may be secondary to peristalsis or and area of narrowing noting a similar appearance when compared to prior.  Correlation with patient's symptomology for any evidence of partial gastric outlet obstruction recommended.  Of note, the gastric pylorus appears somewhat thickened and the possibly of pyloric hypertrophy related to PUD could be considered.  Correlate clinically.    Additional findings as above.    This report was flagged in Epic as abnormal.    Electronically signed by resident: Lauren Bowling  Date:    07/15/2023  Time:    03:31    Electronically signed by: Samson Hinkle MD  Date:    07/15/2023  Time:    04:09               Narrative:    EXAMINATION:  CT ABDOMEN PELVIS WITH CONTRAST    CLINICAL HISTORY:  Abdominal pain, acute, nonlocalized;    TECHNIQUE:  Low dose axial images, sagittal and coronal reformations were  obtained from the lung bases to the pubic symphysis following the IV administration of 75 mL of Omnipaque 350 .  Oral contrast was not administered.    COMPARISON:  Abdominal ultrasound 08/10/2022, CT renal stone study 01/10/2020    FINDINGS:  Heart: Normal in size. No pericardial effusion.    Lung Bases: No Consolidation or pleural effusion.    Liver: Normal in size and attenuation.  Hyperattenuating focus in the right hepatic lobe at the dome noting the presence of a hyperdensity in its region on prior CT renal stone study 01/20/2010 and may reflect a calcification (series 2, image 17).    Gallbladder: No calcified gallstones.    Bile Ducts: No evidence of dilated ducts.    Pancreas: No mass or peripancreatic fat stranding.    Spleen: Normal size and attenuation.    Adrenals: Unremarkable. No focal lesions.    Kidneys/ Ureters: Normal enhancement.  Normal size and location.  Punctate hypodensity in the superior pole of the left kidney, too small to adequately characterize.  No mass or hydronephrosis.  The ureters are unremarkable.    Bladder: No evidence of wall thickening.    Reproductive organs: Dystrophic prostatic calcification.    GI Tract/Mesentery: Stomach is mildly distended.  Gastric pylorus appears similar thickened.  Decreased caliber of the duodenum at the 1st segment of the duodenum (series 2, image 45) which may reflect an area of narrowing or may be secondary to peristalsis (series 2 image 45).  A similar appearance is noted on prior examination 01/20/2010.  No evidence of small-bowel obstruction.  Multiple fluid-filled small bowel loops are noted.  Appendix appears within normal limits.  There are enlarged right lower quadrant lymph nodes, largest measuring 1.5 cm in short axis (series 2 image 95).  These are enlarged from prior.  Additional enlarged lymph node can be seen along the SMA for example a node measuring 1.2 cm in short axis (series 2, image 77).  These demonstrate surrounding  inflammatory stranding.    Peritoneal Space: No ascites. No free air.    Retroperitoneum: Enlarged lymph node adjacent to the inferior vena cava and SMA as above.    Abdominal wall: Unremarkable.    Vasculature: No significant atherosclerosis or aneurysm.    Bones: No acute fracture. No osseous destructive lesions.                                       Medications   morphine injection 4 mg (4 mg Intravenous Given 7/15/23 0236)   ondansetron injection 4 mg (4 mg Intravenous Given 7/15/23 0250)   iohexoL (OMNIPAQUE 350) injection 75 mL (75 mLs Intravenous Given 7/15/23 0254)   morphine injection 4 mg (4 mg Intravenous Given 7/15/23 0402)   sodium chloride 0.9% bolus 1,000 mL 1,000 mL (0 mLs Intravenous Stopped 7/15/23 0526)   ketorolac tablet 10 mg (10 mg Oral Given 7/15/23 6934)     Medical Decision Making:   Initial Assessment:   33-year-old male with history of diverticulosis presenting for right lower quadrant abdominal pain.  Initially, patient is hemodynamically stable but appears to be uncomfortable due to abdominal pain.  Differential Diagnosis:   Appendicitis, diverticulitis, UTI, biliary disease, pancreatitis  Clinical Tests:   Lab Tests: Ordered and Reviewed  The following lab test(s) were unremarkable: CBC, CMP, Lipase and Urinalysis  Radiological Study: Ordered and Reviewed  ED Management:  Patient presents with right lower quadrant abdominal pain concerning for appendicitis.  Low suspicion for bowel obstruction since patient is able to pass gas and has had consistent bowel movements.    Treatments in the emergency department include morphine for pain, Zofran for nausea, normal saline for fluid resuscitation.    Workup as detailed below in ED course.  Workup is significant for mesenteric adenitis as well as mild leukocytosis with left shift.  No evidence of UTI.    Upon reassessment, patient continues to endorse pain, will give another dose of morphine.    Discussed patient's case with oncoming team,  patient pending lipase, CMP.  Patient is most likely going to be discharged.            Attending Attestation:   Physician Attestation Statement for Resident:  As the supervising MD   Physician Attestation Statement: I have personally seen and examined this patient.   I agree with the above history.  -:   As the supervising MD I agree with the above PE.     As the supervising MD I agree with the above treatment, course, plan, and disposition.    I have reviewed and agree with the residents interpretation of the following: CT scans.  I have reviewed the following: old records at this facility.              ED Course as of 07/15/23 0558   Sat Jul 15, 2023   0243 WBC(!): 14.97 [ES]   0243 Gran # (ANC)(!): 10.3 [ES]   0554 Urinalysis, Reflex to Urine Culture Urine, Clean Catch(!)  No UTI [ES]      ED Course User Index  [ES] Diane Buchanan MD                 Clinical Impression:   Final diagnoses:  [R10.9] Abdominal pain, unspecified abdominal location (Primary)  [I88.0] Mesenteric adenitis        ED Disposition Condition    Discharge Stable          ED Prescriptions    None       Follow-up Information       Follow up With Specialties Details Why Contact Info Additional Information    Jasen Francois NP Family Medicine Schedule an appointment as soon as possible for a visit   [address]  [place] [address]       [facility] 4th Fl Gastroenterology Schedule an appointment as soon as possible for a visit   [address] [facility] [address] [phone] GI Center & Urology - Main Building, 4th Floor Please park in [facility] and take [facility] elevator             Diane Buchanan MD  Resident  07/15/23 8813       Tanya Murry MD  07/15/23 3990

## 2023-07-15 NOTE — ASSESSMENT & PLAN NOTE
- Working to optimize BP control  - Holding home meds while normotensive  - Will continue to monitor and further titrate antihypertensives as clinically indicated

## 2023-07-15 NOTE — H&P
[facility] - Emergency Dept  [facility] Medicine  History & Physical    Patient Name: Matthew Austin  MRN: [id number]  Patient Class: OP- Observation  Admission Date: 7/15/2023  Attending Physician: Dontae Irby MD   Primary Care Provider: Jasen Francois NP         Patient information was obtained from patient and ER records.     Subjective:     Principal Problem:Mesenteric adenitis    Chief Complaint:   Chief Complaint   Patient presents with    Abdominal Pain     RLQ abominal pain since yesterday. Started mild and now is worsening. Reports constipation, last BM earlier today. Associated mild nausea, no vomiting. No urinary symptoms.         HPI: Matthew Austin is a 34yo M with a PMH of HTN, HLD, PACHECO, GERD, and MDD who presented to the [facility] [place] on 7/15/23 with complaints of RLQ pain. Pt reports pain started 1 day PTA, first was dull in sensation, then progressed to overt pain. Ranked 10/10 in severity, no radiation. Denies F/C/N/V/D/C, HA, SOB, CP, palpitations, dysuria, extremity swelling, or symptoms otherwise. Given lack of improvement, he came to the ER.    In the ED, VSS. Labs showed WBCs 14.97 and lipase 211. CT abd and pelvis showed right lower quadrant inflammatory lymphadenopathy as well as an adjacent enlarged lymph node along the SMA, as above; the appendix appears within normal limits; these findings are favored to represent mesenteric adenitis. Abd US showed Mild hepatosplenomegaly with diffuse hepatic steatosis. ED gave IV morphine, though pain persisted. Hospital medicine was consulted for admission and further management.      Past Medical History:   Diagnosis Date    Anxiety     Cervical spondylosis with radiculopathy     Depression     History of psychiatric hospitalization     HLD (hyperlipidemia)     HTN (hypertension)     Hx of psychiatric care     Hypertension     Elayne     Psychiatric problem     Suicide attempt     Suicide attempt by substance overdose 6/25/2017    Therapy         No past surgical history on file.    Review of patient's allergies indicates:  No Known Allergies    No current facility-administered medications on file prior to encounter.     Current Outpatient Medications on File Prior to Encounter   Medication Sig    ALPRAZolam (XANAX) 1 MG tablet Take 1 mg by mouth as needed for Anxiety.    atorvastatin (LIPITOR) 10 MG tablet Take 10 mg by mouth once daily.    azelastine (ASTELIN) 137 mcg (0.1 %) nasal spray 1 spray by Nasal route 2 (two) times daily.    baclofen (LIORESAL) 20 MG tablet Take 20 mg by mouth 3 (three) times daily.    buPROPion (WELLBUTRIN XL) 300 MG 24 hr tablet SMARTSI Tablet(s) By Mouth Every Morning    cloNIDine (CATAPRES) 0.1 MG tablet clonidine HCl 0.1 mg tablet   TAKE 1 TABLET BY MOUTH TWICE DAILY    dextroamphetamine-amphetamine 30 mg Tab TAKE 1 TABLET BY MOUTH TWICE DAILY    famotidine (PEPCID) 40 MG tablet SMARTSI Tablet(s) By Mouth Daily    fluticasone propionate (FLONASE) 50 mcg/actuation nasal spray 1 spray by Each Nostril route once daily.    gabapentin (NEURONTIN) 300 MG capsule gabapentin 300 mg capsule   TAKE 1 CAPSULE BY MOUTH TWICE DAILY    hydroCHLOROthiazide (HYDRODIURIL) 25 MG tablet Take 25 mg by mouth once daily.    LINZESS 145 mcg Cap capsule Take 145 mcg by mouth once daily.    losartan (COZAAR) 100 MG tablet losartan 100 mg tablet    nicotine (NICODERM CQ) 7 mg/24 hr Place 1 patch onto the skin once daily. (Patient not taking: No sig reported)    olmesartan medoxomil (OLMESARTAN ORAL) Take by mouth once daily.    pimecrolimus (ELIDEL) 1 % cream Apply topically 2 (two) times daily.    ramelteon (ROZEREM) 8 mg tablet Take 1 tablet (8 mg total) by mouth nightly as needed for Insomnia. (Patient not taking: Reported on 2022)     Family History    None       Tobacco Use    Smoking status: Former     Packs/day: 1.00     Years: 3.00     Pack years: 3.00     Types: Cigarettes    Smokeless tobacco: Never    Substance and Sexual Activity    Alcohol use: Yes     Comment: no daily use, ~ 1-4 servings weekly    Drug use: Not Currently     Types: Marijuana    Sexual activity: Yes     Partners: Male       ROS  General ROS: negative for weight loss, weight gain, fevers, chills, night sweats  ENT ROS: negative for epistaxis, headaches or sinus pain  Ophthalmic ROS: negative for blurry vision, decreased vision, double vision or itchy eyes  Cardiovascular ROS: negative for chest pain or dyspnea on exertion  Respiratory ROS: negative for cough, shortness of breath, or wheezing  Gastrointestinal ROS: See HPI  Genito-Urinary ROS: negative for dysuria, trouble voiding, or hematuria  Neurological ROS: negative for TIA or stroke symptoms  Endocrine ROS: negative for hair pattern changes or unexpected weight changes  Musculoskeletal ROS: negative for muscle pain, weakness, joint pain or joint swelling  Skin: negative for rash, wounds, skin breakdown, or issues otherwise  Hematological and Lymphatic ROS: negative for bleeding, bruising, swollen lymph nodes  Psychological ROS: negative for anxiety or depression      Objective:     Vital Signs (Most Recent):  Temp: 97.9 °F (36.6 °C) (07/15/23 0735)  Pulse: 66 (07/15/23 1034)  Resp: 18 (07/15/23 1034)  BP: 119/65 (07/15/23 1034)  SpO2: 99 % (07/15/23 1034) Vital Signs (24h Range):  Temp:  [97.9 °F (36.6 °C)-98.7 °F (37.1 °C)] 97.9 °F (36.6 °C)  Pulse:  [66-93] 66  Resp:  [16-18] 18  SpO2:  [97 %-99 %] 99 %  BP: (105-130)/(63-83) 119/65        There is no height or weight on file to calculate BMI.       Physical Exam  Gen: in NAD, appears stated age  Neuro: AAOx4, CN2-12 grossly intact BL; motor, sensory, and strength grossly intact BL  HEENT: NTNC, EOMI, PERRLA, MMM; no thyromegaly or lymphadenopathy; no JVD appreciated  CVS: RRR, no m/r/g; S1/S2 auscultated with no S3 or S4; capillary refill < 2 sec  Resp: lungs CTAB, no w/r/r; no belabored breathing or accessory muscle use  appreciated   Abd: BS+ in all 4 quadrants; TTP in RLQ, soft to palpation; no organomegaly appreciated   Extrem: pulses full, equal, and regular over all 4 extremities; no UE or LE edema BL       Significant Labs: All pertinent labs within the past 24 hours have been reviewed.    Significant Imaging: I have reviewed all pertinent imaging results/findings within the past 24 hours.    Assessment/Plan:     * Mesenteric adenitis  - Interval history and physical exam findings as described above  - CT and US findings reviewed  - Leukocytosis thought to be from reactionary inflammation, no acute signs of infection  - Unclear etiology  - IVF LR at 125cc/hr  - CLD for now, advance as tolerated  - PRN analgesics provided  - Continuing to monitor    Right lower quadrant abdominal pain  - As above    Hepatosplenomegaly  - Evidenced on US    Hepatic steatosis  - Known history, present again on US    Essential hypertension  - Working to optimize BP control  - Holding home meds while normotensive  - Will continue to monitor and further titrate antihypertensives as clinically indicated     Mixed hyperlipidemia  - Continue home statin regimen    Gastroesophageal reflux disease without esophagitis  - Currently asymptomatic  - Continue home pepcid regimen    Recurrent major depressive disorder, in partial remission  - Currently asymptomatic  - Continue home wellbutrin regimen      VTE Risk Mitigation (From admission, onward)         Ordered     enoxaparin injection 40 mg  Daily         07/15/23 1124     IP VTE HIGH RISK PATIENT  Once         07/15/23 1124     Place sequential compression device  Until discontinued         07/15/23 1124                   On 07/15/2023, patient should be placed in hospital observation services under my care.        Dontae Irby MD  Attending Physician  Medical Director - [facility]  Department of Hospital Medicine  7/15/2023

## 2023-07-15 NOTE — PROVIDER PROGRESS NOTES - EMERGENCY DEPT.
ED Resident HAND-OFF NOTE:  6:08 AM 7/15/2023  Matthew Austin is a 33 y.o. male who presented to the ED on 7/15/2023 and has been managed by Dr. Murry and Dr. Buchanan, who reports patient C/O RLQ abdominal pain. I assumed care of patient from off-going ED physician team at 6:08 AM pending CMP and lipase.    On my evaluation, Matthew Austin appears well, hemodynamically stable and in NAD. Thus far, Matthew Austin has received:  Medications   morphine injection 4 mg (4 mg Intravenous Given 7/15/23 0236)   ondansetron injection 4 mg (4 mg Intravenous Given 7/15/23 0250)   iohexoL (OMNIPAQUE 350) injection 75 mL (75 mLs Intravenous Given 7/15/23 0254)   morphine injection 4 mg (4 mg Intravenous Given 7/15/23 0402)   sodium chloride 0.9% bolus 1,000 mL 1,000 mL (0 mLs Intravenous Stopped 7/15/23 0526)   ketorolac tablet 10 mg (10 mg Oral Given 7/15/23 0438)   morphine injection 2 mg (2 mg Intravenous Given 7/15/23 0803)     - CMP shows no gross electrolyte abnormality.  Lipase is elevated, however, the patient's complaint was of right lower quadrant abdominal pain and the CT did not show any peripancreatic fat stranding. On re-evaluation patient does have some rebound TTP in the epigastrium. I will obtain a RUQ US to further elucidate any biliary pathology. I will also give the patient an additional dose of morphine as the he complains of 10/10 pain.  - Common bile duct is not dilated.  Gallbladder has tiny mobile gallstones but no wall thickening, pericholecystic fluid or sonographic Rodgers's sign.  - on re-evaluation patient still reports severe pain.  I will admit the patient to Hospital Medicine for intractable pain and elevated lipase.  ______________________  Bailee Solorzano MD  Emergency Medicine Resident  6:08 AM 7/15/2023

## 2023-07-16 LAB
ALBUMIN SERPL BCP-MCNC: 3.3 G/DL (ref 3.5–5.2)
ALP SERPL-CCNC: 75 U/L (ref 55–135)
ALT SERPL W/O P-5'-P-CCNC: 26 U/L (ref 10–44)
ANION GAP SERPL CALC-SCNC: 10 MMOL/L (ref 8–16)
AST SERPL-CCNC: 17 U/L (ref 10–40)
BASOPHILS # BLD AUTO: 0.03 K/UL (ref 0–0.2)
BASOPHILS NFR BLD: 0.3 % (ref 0–1.9)
BILIRUB SERPL-MCNC: 0.3 MG/DL (ref 0.1–1)
BUN SERPL-MCNC: 9 MG/DL (ref 6–20)
CALCIUM SERPL-MCNC: 9.2 MG/DL (ref 8.7–10.5)
CHLORIDE SERPL-SCNC: 100 MMOL/L (ref 95–110)
CO2 SERPL-SCNC: 30 MMOL/L (ref 23–29)
CREAT SERPL-MCNC: 1 MG/DL (ref 0.5–1.4)
DIFFERENTIAL METHOD: ABNORMAL
EOSINOPHIL # BLD AUTO: 0.1 K/UL (ref 0–0.5)
EOSINOPHIL NFR BLD: 0.8 % (ref 0–8)
ERYTHROCYTE [DISTWIDTH] IN BLOOD BY AUTOMATED COUNT: 12.5 % (ref 11.5–14.5)
EST. GFR  (NO RACE VARIABLE): >60 ML/MIN/1.73 M^2
GLUCOSE SERPL-MCNC: 85 MG/DL (ref 70–110)
HCT VFR BLD AUTO: 39.1 % (ref 40–54)
HGB BLD-MCNC: 12.9 G/DL (ref 14–18)
IMM GRANULOCYTES # BLD AUTO: 0.03 K/UL (ref 0–0.04)
IMM GRANULOCYTES NFR BLD AUTO: 0.3 % (ref 0–0.5)
LYMPHOCYTES # BLD AUTO: 2.1 K/UL (ref 1–4.8)
LYMPHOCYTES NFR BLD: 17.3 % (ref 18–48)
MAGNESIUM SERPL-MCNC: 1.9 MG/DL (ref 1.6–2.6)
MCH RBC QN AUTO: 31.2 PG (ref 27–31)
MCHC RBC AUTO-ENTMCNC: 33 G/DL (ref 32–36)
MCV RBC AUTO: 95 FL (ref 82–98)
MONOCYTES # BLD AUTO: 1.1 K/UL (ref 0.3–1)
MONOCYTES NFR BLD: 8.9 % (ref 4–15)
NEUTROPHILS # BLD AUTO: 8.7 K/UL (ref 1.8–7.7)
NEUTROPHILS NFR BLD: 72.4 % (ref 38–73)
NRBC BLD-RTO: 0 /100 WBC
PHOSPHATE SERPL-MCNC: 3.3 MG/DL (ref 2.7–4.5)
PLATELET # BLD AUTO: 248 K/UL (ref 150–450)
PMV BLD AUTO: 10 FL (ref 9.2–12.9)
POTASSIUM SERPL-SCNC: 4.2 MMOL/L (ref 3.5–5.1)
PROT SERPL-MCNC: 6.4 G/DL (ref 6–8.4)
RBC # BLD AUTO: 4.13 M/UL (ref 4.6–6.2)
SODIUM SERPL-SCNC: 140 MMOL/L (ref 136–145)
WBC # BLD AUTO: 11.96 K/UL (ref 3.9–12.7)

## 2023-07-16 PROCEDURE — 36415 COLL VENOUS BLD VENIPUNCTURE: CPT | Performed by: STUDENT IN AN ORGANIZED HEALTH CARE EDUCATION/TRAINING PROGRAM

## 2023-07-16 PROCEDURE — 99233 SBSQ HOSP IP/OBS HIGH 50: CPT | Mod: ,,, | Performed by: STUDENT IN AN ORGANIZED HEALTH CARE EDUCATION/TRAINING PROGRAM

## 2023-07-16 PROCEDURE — 96376 TX/PRO/DX INJ SAME DRUG ADON: CPT

## 2023-07-16 PROCEDURE — 85025 COMPLETE CBC W/AUTO DIFF WBC: CPT | Performed by: STUDENT IN AN ORGANIZED HEALTH CARE EDUCATION/TRAINING PROGRAM

## 2023-07-16 PROCEDURE — 96372 THER/PROPH/DIAG INJ SC/IM: CPT | Performed by: STUDENT IN AN ORGANIZED HEALTH CARE EDUCATION/TRAINING PROGRAM

## 2023-07-16 PROCEDURE — 99233 PR SUBSEQUENT HOSPITAL CARE,LEVL III: ICD-10-PCS | Mod: ,,, | Performed by: STUDENT IN AN ORGANIZED HEALTH CARE EDUCATION/TRAINING PROGRAM

## 2023-07-16 PROCEDURE — 84100 ASSAY OF PHOSPHORUS: CPT | Performed by: STUDENT IN AN ORGANIZED HEALTH CARE EDUCATION/TRAINING PROGRAM

## 2023-07-16 PROCEDURE — 63600175 PHARM REV CODE 636 W HCPCS: Performed by: STUDENT IN AN ORGANIZED HEALTH CARE EDUCATION/TRAINING PROGRAM

## 2023-07-16 PROCEDURE — 80053 COMPREHEN METABOLIC PANEL: CPT | Performed by: STUDENT IN AN ORGANIZED HEALTH CARE EDUCATION/TRAINING PROGRAM

## 2023-07-16 PROCEDURE — 25000003 PHARM REV CODE 250: Performed by: STUDENT IN AN ORGANIZED HEALTH CARE EDUCATION/TRAINING PROGRAM

## 2023-07-16 PROCEDURE — 96375 TX/PRO/DX INJ NEW DRUG ADDON: CPT

## 2023-07-16 PROCEDURE — 96361 HYDRATE IV INFUSION ADD-ON: CPT

## 2023-07-16 PROCEDURE — A4216 STERILE WATER/SALINE, 10 ML: HCPCS | Performed by: STUDENT IN AN ORGANIZED HEALTH CARE EDUCATION/TRAINING PROGRAM

## 2023-07-16 PROCEDURE — G0378 HOSPITAL OBSERVATION PER HR: HCPCS

## 2023-07-16 PROCEDURE — 83735 ASSAY OF MAGNESIUM: CPT | Performed by: STUDENT IN AN ORGANIZED HEALTH CARE EDUCATION/TRAINING PROGRAM

## 2023-07-16 RX ORDER — KETOROLAC TROMETHAMINE 30 MG/ML
30 INJECTION, SOLUTION INTRAMUSCULAR; INTRAVENOUS EVERY 6 HOURS PRN
Status: DISCONTINUED | OUTPATIENT
Start: 2023-07-16 | End: 2023-07-17 | Stop reason: HOSPADM

## 2023-07-16 RX ORDER — KETOROLAC TROMETHAMINE 30 MG/ML
30 INJECTION, SOLUTION INTRAMUSCULAR; INTRAVENOUS ONCE
Status: COMPLETED | OUTPATIENT
Start: 2023-07-16 | End: 2023-07-16

## 2023-07-16 RX ADMIN — Medication 10 ML: at 01:07

## 2023-07-16 RX ADMIN — GABAPENTIN 300 MG: 300 CAPSULE ORAL at 08:07

## 2023-07-16 RX ADMIN — ATORVASTATIN CALCIUM 10 MG: 10 TABLET, FILM COATED ORAL at 07:07

## 2023-07-16 RX ADMIN — MORPHINE SULFATE 2 MG: 4 INJECTION INTRAVENOUS at 05:07

## 2023-07-16 RX ADMIN — SODIUM CHLORIDE, POTASSIUM CHLORIDE, SODIUM LACTATE AND CALCIUM CHLORIDE: 600; 310; 30; 20 INJECTION, SOLUTION INTRAVENOUS at 05:07

## 2023-07-16 RX ADMIN — SODIUM CHLORIDE, POTASSIUM CHLORIDE, SODIUM LACTATE AND CALCIUM CHLORIDE: 600; 310; 30; 20 INJECTION, SOLUTION INTRAVENOUS at 01:07

## 2023-07-16 RX ADMIN — ACETAMINOPHEN 650 MG: 325 TABLET ORAL at 08:07

## 2023-07-16 RX ADMIN — KETOROLAC TROMETHAMINE 30 MG: 30 INJECTION, SOLUTION INTRAMUSCULAR; INTRAVENOUS at 02:07

## 2023-07-16 RX ADMIN — OXYCODONE HYDROCHLORIDE 10 MG: 10 TABLET ORAL at 03:07

## 2023-07-16 RX ADMIN — ENOXAPARIN SODIUM 40 MG: 40 INJECTION SUBCUTANEOUS at 05:07

## 2023-07-16 RX ADMIN — FAMOTIDINE 40 MG: 20 TABLET, FILM COATED ORAL at 07:07

## 2023-07-16 RX ADMIN — KETOROLAC TROMETHAMINE 30 MG: 30 INJECTION, SOLUTION INTRAMUSCULAR; INTRAVENOUS at 07:07

## 2023-07-16 RX ADMIN — BUPROPION HYDROCHLORIDE 300 MG: 300 TABLET, FILM COATED, EXTENDED RELEASE ORAL at 07:07

## 2023-07-16 RX ADMIN — Medication 10 ML: at 09:07

## 2023-07-16 RX ADMIN — KETOROLAC TROMETHAMINE 30 MG: 30 INJECTION, SOLUTION INTRAMUSCULAR; INTRAVENOUS at 10:07

## 2023-07-16 RX ADMIN — GABAPENTIN 300 MG: 300 CAPSULE ORAL at 07:07

## 2023-07-16 RX ADMIN — Medication 10 ML: at 06:07

## 2023-07-16 NOTE — NURSING
Pt AAOX4. No distress noted. Bed in lowest position. Side rails up x2. Call bell and personal belongs within reach. Bed alarm activated. Safety precautions maintained. Pt free of falls or injuries. No further issues or concerns at this time. Plan of care continues.

## 2023-07-16 NOTE — SUBJECTIVE & OBJECTIVE
Interval History: Pt seen and examined this morning on kojo GROSS. Pain improving, though still present. Discussed using toradol over opiates today to help with inflammation. Diet advanced. Care plan reviewed. Otherwise, doing well and with no further complaints at this time.      Objective:     Vital Signs (Most Recent):  Temp: 98.6 °F (37 °C) (07/16/23 0755)  Pulse: 88 (07/16/23 0755)  Resp: 18 (07/16/23 0755)  BP: 114/64 (07/16/23 0755)  SpO2: 96 % (07/16/23 0755) Vital Signs (24h Range):  Temp:  [97.7 °F (36.5 °C)-98.6 °F (37 °C)] 98.6 °F (37 °C)  Pulse:  [64-95] 88  Resp:  [16-20] 18  SpO2:  [95 %-100 %] 96 %  BP: (106-132)/(63-74) 114/64     Weight: 79.4 kg (175 lb 0.7 oz)  Body mass index is 30.05 kg/m².    Intake/Output Summary (Last 24 hours) at 7/16/2023 0958  Last data filed at 7/16/2023 0534  Gross per 24 hour   Intake 1100 ml   Output 400 ml   Net 700 ml         Physical Exam  Gen: in NAD, appears stated age  Neuro: AAOx4, CN2-12 grossly intact BL; motor, sensory, and strength grossly intact BL  HEENT: NTNC, EOMI, PERRLA, MMM; no thyromegaly or lymphadenopathy; no JVD appreciated  CVS: RRR, no m/r/g; S1/S2 auscultated with no S3 or S4; capillary refill < 2 sec  Resp: lungs CTAB, no w/r/r; no belabored breathing or accessory muscle use appreciated   Abd: BS+ in all 4 quadrants; TTP in RLQ, soft to palpation; no organomegaly appreciated   Extrem: pulses full, equal, and regular over all 4 extremities; no UE or LE edema BL      Significant Labs: All pertinent labs within the past 24 hours have been reviewed.    Significant Imaging: I have reviewed all pertinent imaging results/findings within the past 24 hours.

## 2023-07-16 NOTE — PLAN OF CARE
Patient is alert and oriented with no communication barriers. Patient currently lives with hs mother. Patient is not on coumadin or dialysis. Patient family will transport him at discharge.   07/16/23 1518   Discharge Assessment   Assessment Type Discharge Planning Assessment   Confirmed/corrected address, phone number and insurance Yes   Confirmed Demographics Correct on Facesheet   Source of Information patient   People in Home parent(s)   Do you expect to return to your current living situation? Yes   Do you have help at home or someone to help you manage your care at home? No   Prior to hospitilization cognitive status: Alert/Oriented   Current cognitive status: Alert/Oriented   Equipment Currently Used at Home none   Readmission within 30 days? No   Patient currently being followed by outpatient case management? No   Do you currently have service(s) that help you manage your care at home? No   Do you take prescription medications? Yes   Do you have prescription coverage? Yes   Coverage Medicaid   Do you have any problems affording any of your prescribed medications? No   Is the patient taking medications as prescribed? yes   Who is going to help you get home at discharge? Mother   How do you get to doctors appointments? family or friend will provide   Are you on dialysis? No   Do you take coumadin? No   Discharge Plan A Home   DME Needed Upon Discharge  none   Transition of Care Barriers None   Physical Activity   On average, how many days per week do you engage in moderate to strenuous exercise (like a brisk walk)? 0 days   On average, how many minutes do you engage in exercise at this level? 0 min   Financial Resource Strain   How hard is it for you to pay for the very basics like food, housing, medical care, and heating? Not hard   Housing Stability   In the last 12 months, was there a time when you were not able to pay the mortgage or rent on time? N   In the last 12 months, was there a time when you did  not have a steady place to sleep or slept in a shelter (including now)? N   Transportation Needs   In the past 12 months, has lack of transportation kept you from medical appointments or from getting medications? no   In the past 12 months, has lack of transportation kept you from meetings, work, or from getting things needed for daily living? No   Food Insecurity   Within the past 12 months, you worried that your food would run out before you got the money to buy more. Never true   Stress   Do you feel stress - tense, restless, nervous, or anxious, or unable to sleep at night because your mind is troubled all the time - these days? Not at all   Social Connections   In a typical week, how many times do you talk on the phone with family, friends, or neighbors? More than 3   How often do you get together with friends or relatives? Once   How often do you attend Nondenominational or Druze services? Never   Do you belong to any clubs or organizations such as Nondenominational groups, unions, fraternal or athletic groups, or school groups? No   How often do you attend meetings of the clubs or organizations you belong to? Never   Are you , , , , never , or living with a partner? Never marrie   Alcohol Use   Q1: How often do you have a drink containing alcohol? Monthly or l   Q2: How many drinks containing alcohol do you have on a typical day when you are drinking? 1 or 2   Q3: How often do you have six or more drinks on one occasion? Never   OTHER   Name(s) of People in Home Frances Mayberry [facility] - [facility]  Initial Discharge Assessment       Primary Care Provider: Jasen Francois NP    Admission Diagnosis: Mesenteric adenitis [I88.0]  Chest pain [R07.9]  Abdominal pain, unspecified abdominal location [R10.9]    Admission Date: 7/15/2023  Expected Discharge Date: 7/17/2023    Transition of Care Barriers: (P) None    Payor: MEDICAID / Plan: [facility] ([place] MEDICAID) /  Product Type: Managed Medicaid /     Extended Emergency Contact Information  Primary Emergency Contact: Frances Austin  Address: [address] [place] [address] [place]  Home Phone: [phone]  Mobile Phone: [phone]  Relation: Mother    Discharge Plan A: (P) Home         [id number] - [place], [place] - [address]  [address] [address]  Phone: [phone] Fax: [phone]      Initial Assessment (most recent)       Adult Discharge Assessment - 07/16/23 1518          Discharge Assessment    Assessment Type Discharge Planning Assessment (P)      Confirmed/corrected address, phone number and insurance Yes (P)      Confirmed Demographics Correct on Facesheet (P)      Source of Information patient (P)      People in Home parent(s) (P)      Do you expect to return to your current living situation? Yes (P)      Do you have help at home or someone to help you manage your care at home? No (P)      Prior to hospitilization cognitive status: Alert/Oriented (P)      Current cognitive status: Alert/Oriented (P)      Equipment Currently Used at Home none (P)      Readmission within 30 days? No (P)      Patient currently being followed by outpatient case management? No (P)      Do you currently have service(s) that help you manage your care at home? No (P)      Do you take prescription medications? Yes (P)      Do you have prescription coverage? Yes (P)      Coverage Medicaid (P)      Do you have any problems affording any of your prescribed medications? No (P)      Is the patient taking medications as prescribed? yes (P)      Who is going to help you get home at discharge? Mother (P)      How do you get to doctors appointments? family or friend will provide (P)      Are you on dialysis? No (P)      Do you take coumadin? No (P)      Discharge Plan A Home (P)      DME Needed Upon Discharge  none (P)      Transition of  Care Barriers None (P)         Physical Activity    On average, how many days per week do you engage in moderate to strenuous exercise (like a brisk walk)? 0 days (P)      On average, how many minutes do you engage in exercise at this level? 0 min (P)         Financial Resource Strain    How hard is it for you to pay for the very basics like food, housing, medical care, and heating? Not hard at all (P)         Housing Stability    In the last 12 months, was there a time when you were not able to pay the mortgage or rent on time? No (P)      In the last 12 months, was there a time when you did not have a steady place to sleep or slept in a shelter (including now)? No (P)         Transportation Needs    In the past 12 months, has lack of transportation kept you from medical appointments or from getting medications? No (P)      In the past 12 months, has lack of transportation kept you from meetings, work, or from getting things needed for daily living? No (P)         Food Insecurity    Within the past 12 months, you worried that your food would run out before you got the money to buy more. Never true (P)         Stress    Do you feel stress - tense, restless, nervous, or anxious, or unable to sleep at night because your mind is troubled all the time - these days? Not at all (P)         Social Connections    In a typical week, how many times do you talk on the phone with family, friends, or neighbors? More than three times a week (P)      How often do you get together with friends or relatives? Once a week (P)      How often do you attend Alevism or Restorationist services? Never (P)      Do you belong to any clubs or organizations such as Alevism groups, unions, fraternal or athletic groups, or school groups? No (P)      How often do you attend meetings of the clubs or organizations you belong to? Never (P)      Are you , , , , never , or living with a partner? Never  (P)          Alcohol Use    Q1: How often do you have a drink containing alcohol? Monthly or less (P)      Q2: How many drinks containing alcohol do you have on a typical day when you are drinking? 1 or 2 (P)      Q3: How often do you have six or more drinks on one occasion? Never (P)         OTHER    Name(s) of People in Home Frances Austin (P)

## 2023-07-16 NOTE — PROGRESS NOTES
[facility] [address] [place] [address] Medicine  Progress Note    Patient Name: Matthew Austin  MRN: [id number]  Patient Class: OP- Observation   Admission Date: 7/15/2023  Length of Stay: 0 days  Attending Physician: Dontae Irby MD  Primary Care Provider: Jasen Francois NP        Subjective:     Principal Problem:Mesenteric adenitis        HPI:  Matthew Austin is a 32yo M with a PMH of HTN, HLD, PACHECO, GERD, and MDD who presented to the [facility] ED on 7/15/23 with complaints of RLQ pain. Pt reports pain started 1 day PTA, first was dull in sensation, then progressed to overt pain. Ranked 10/10 in severity, no radiation. Denies F/C/N/V/D/C, HA, SOB, CP, palpitations, dysuria, extremity swelling, or symptoms otherwise. Given lack of improvement, he came to the ER.    In the ED, VSS. Labs showed WBCs 14.97 and lipase 211. CT abd and pelvis showed right lower quadrant inflammatory lymphadenopathy as well as an adjacent enlarged lymph node along the SMA, as above; the appendix appears within normal limits; these findings are favored to represent mesenteric adenitis. Abd US showed Mild hepatosplenomegaly with diffuse hepatic steatosis. ED gave IV morphine, though pain persisted. Hospital medicine was consulted for admission and further management.      Overview/Hospital Course:  [place] [facility] and started on IVF and PRN analgesics. Pain improving into 7/16, and leukocytosis resolved.      Interval History: Pt seen and examined this morning on shikha. GINNY. Pain improving, though still present. Discussed using toradol over opiates today to help with inflammation. Diet advanced. Care plan reviewed. Otherwise, doing well and with no further complaints at this time.      Objective:     Vital Signs (Most Recent):  Temp: 98.6 °F (37 °C) (07/16/23 0755)  Pulse: 88 (07/16/23 0755)  Resp: 18 (07/16/23 0755)  BP: 114/64 (07/16/23 0755)  SpO2: 96 % (07/16/23 0755) Vital Signs (24h Range):  Temp:  [97.7 °F (36.5 °C)-98.6 °F (37  °C)] 98.6 °F (37 °C)  Pulse:  [64-95] 88  Resp:  [16-20] 18  SpO2:  [95 %-100 %] 96 %  BP: (106-132)/(63-74) 114/64     Weight: 79.4 kg (175 lb 0.7 oz)  Body mass index is 30.05 kg/m².    Intake/Output Summary (Last 24 hours) at 7/16/2023 0924  Last data filed at 7/16/2023 0534  Gross per 24 hour   Intake 1100 ml   Output 400 ml   Net 700 ml         Physical Exam  Gen: in NAD, appears stated age  Neuro: AAOx4, CN2-12 grossly intact BL; motor, sensory, and strength grossly intact BL  HEENT: NTNC, EOMI, PERRLA, MMM; no thyromegaly or lymphadenopathy; no JVD appreciated  CVS: RRR, no m/r/g; S1/S2 auscultated with no S3 or S4; capillary refill < 2 sec  Resp: lungs CTAB, no w/r/r; no belabored breathing or accessory muscle use appreciated   Abd: BS+ in all 4 quadrants; TTP in RLQ, soft to palpation; no organomegaly appreciated   Extrem: pulses full, equal, and regular over all 4 extremities; no UE or LE edema BL      Significant Labs: All pertinent labs within the past 24 hours have been reviewed.    Significant Imaging: I have reviewed all pertinent imaging results/findings within the past 24 hours.      Assessment/Plan:      * Mesenteric adenitis  - Interval history and physical exam findings as described above  - CT and US findings reviewed  - Leukocytosis resolved  - Unclear etiology  - IVF LR at 125cc/hr  - Diet advanced  - PRN analgesics provided: favor toradol over opiates  - Continuing to monitor    Right lower quadrant abdominal pain  - As above    Hepatosplenomegaly  - Evidenced on US    Hepatic steatosis  - Known history, present again on US    Essential hypertension  - Working to optimize BP control  - Holding home meds while normotensive  - Will continue to monitor and further titrate antihypertensives as clinically indicated     Mixed hyperlipidemia  - Continue home statin regimen    Gastroesophageal reflux disease without esophagitis  - Currently asymptomatic  - Continue home pepcid regimen    Recurrent  major depressive disorder, in partial remission  - Currently asymptomatic  - Continue home wellbutrin regimen      VTE Risk Mitigation (From admission, onward)         Ordered     enoxaparin injection 40 mg  Daily         07/15/23 1124     IP VTE HIGH RISK PATIENT  Once         07/15/23 1124     Place sequential compression device  Until discontinued         07/15/23 1124                Discharge Planning   ZAKIA: 7/17/2023     Code Status: Full Code   Is the patient medically ready for discharge?: No    Reason for patient still in hospital (select all that apply): Patient trending condition           Dontae Irby MD  Attending Physician  Medical Director - [facility] [place] [facility] Observation [facility]  Department of Hospital Medicine  7/16/2023

## 2023-07-16 NOTE — HOSPITAL COURSE
Pt admitted to [facility] and started on IVF and PRN analgesics. Pain improving into 7/16, and leukocytosis resolved. Pt continued to do well into 7/17 and was deemed appropriate for discharge. Plan discussed with pt, who was agreeable and amenable; medications were discussed and reviewed, outpatient follow-up scheduled, ER precautions were given, all questions were answered to the pt's satisfaction, and Mr. Austin was subsequently discharged.

## 2023-07-16 NOTE — PLAN OF CARE
Problem: Adult Inpatient Plan of Care  Goal: Plan of Care Review  Outcome: Ongoing, Progressing  Goal: Absence of Hospital-Acquired Illness or Injury  Outcome: Ongoing, Progressing  Goal: Optimal Comfort and Wellbeing  Outcome: Ongoing, Progressing  Plan of care reviewed. Pt verbalized understanding. Pt progressing toward goals. No distress noted. No falls or injuries during shift. Pt bed in lowest position. Side rails x2. Bed alarm activated. Call bell and personal belongs within reach. Safety precautions maintained.

## 2023-07-16 NOTE — ASSESSMENT & PLAN NOTE
- Interval history and physical exam findings as described above  - CT and US findings reviewed  - Leukocytosis resolved  - Unclear etiology  - IVF LR at 125cc/hr  - Diet advanced  - PRN analgesics provided: favor toradol over opiates  - Continuing to monitor

## 2023-07-17 VITALS
HEART RATE: 72 BPM | WEIGHT: 175.06 LBS | HEIGHT: 64 IN | OXYGEN SATURATION: 94 % | RESPIRATION RATE: 18 BRPM | TEMPERATURE: 98 F | DIASTOLIC BLOOD PRESSURE: 80 MMHG | SYSTOLIC BLOOD PRESSURE: 133 MMHG | BODY MASS INDEX: 29.89 KG/M2

## 2023-07-17 LAB
ALBUMIN SERPL BCP-MCNC: 3.3 G/DL (ref 3.5–5.2)
ALP SERPL-CCNC: 90 U/L (ref 55–135)
ALT SERPL W/O P-5'-P-CCNC: 32 U/L (ref 10–44)
ANION GAP SERPL CALC-SCNC: 11 MMOL/L (ref 8–16)
AST SERPL-CCNC: 21 U/L (ref 10–40)
B HENSELAE IGG TITR SER IF: NORMAL TITER
B HENSELAE IGM TITR SER IF: NORMAL TITER
B QUINTANA IGG TITR SER IF: NORMAL TITER
B QUINTANA IGM TITR SER IF: NORMAL TITER
BASOPHILS # BLD AUTO: 0.03 K/UL (ref 0–0.2)
BASOPHILS NFR BLD: 0.3 % (ref 0–1.9)
BILIRUB SERPL-MCNC: 0.2 MG/DL (ref 0.1–1)
BUN SERPL-MCNC: 11 MG/DL (ref 6–20)
CALCIUM SERPL-MCNC: 9.1 MG/DL (ref 8.7–10.5)
CHLORIDE SERPL-SCNC: 104 MMOL/L (ref 95–110)
CO2 SERPL-SCNC: 27 MMOL/L (ref 23–29)
CREAT SERPL-MCNC: 1 MG/DL (ref 0.5–1.4)
DIFFERENTIAL METHOD: ABNORMAL
EOSINOPHIL # BLD AUTO: 0.1 K/UL (ref 0–0.5)
EOSINOPHIL NFR BLD: 0.7 % (ref 0–8)
ERYTHROCYTE [DISTWIDTH] IN BLOOD BY AUTOMATED COUNT: 12.3 % (ref 11.5–14.5)
EST. GFR  (NO RACE VARIABLE): >60 ML/MIN/1.73 M^2
GLUCOSE SERPL-MCNC: 83 MG/DL (ref 70–110)
HCT VFR BLD AUTO: 39.4 % (ref 40–54)
HGB BLD-MCNC: 13.3 G/DL (ref 14–18)
IMM GRANULOCYTES # BLD AUTO: 0.04 K/UL (ref 0–0.04)
IMM GRANULOCYTES NFR BLD AUTO: 0.3 % (ref 0–0.5)
LYMPHOCYTES # BLD AUTO: 2.2 K/UL (ref 1–4.8)
LYMPHOCYTES NFR BLD: 18.9 % (ref 18–48)
MAGNESIUM SERPL-MCNC: 2.1 MG/DL (ref 1.6–2.6)
MCH RBC QN AUTO: 31.4 PG (ref 27–31)
MCHC RBC AUTO-ENTMCNC: 33.8 G/DL (ref 32–36)
MCV RBC AUTO: 93 FL (ref 82–98)
MONOCYTES # BLD AUTO: 1.4 K/UL (ref 0.3–1)
MONOCYTES NFR BLD: 11.8 % (ref 4–15)
NEUTROPHILS # BLD AUTO: 7.8 K/UL (ref 1.8–7.7)
NEUTROPHILS NFR BLD: 68 % (ref 38–73)
NRBC BLD-RTO: 0 /100 WBC
PHOSPHATE SERPL-MCNC: 3.3 MG/DL (ref 2.7–4.5)
PLATELET # BLD AUTO: 257 K/UL (ref 150–450)
PMV BLD AUTO: 9.8 FL (ref 9.2–12.9)
POTASSIUM SERPL-SCNC: 3.8 MMOL/L (ref 3.5–5.1)
PROT SERPL-MCNC: 6.6 G/DL (ref 6–8.4)
RBC # BLD AUTO: 4.24 M/UL (ref 4.6–6.2)
SODIUM SERPL-SCNC: 142 MMOL/L (ref 136–145)
WBC # BLD AUTO: 11.47 K/UL (ref 3.9–12.7)

## 2023-07-17 PROCEDURE — 99239 PR HOSPITAL DISCHARGE DAY,>30 MIN: ICD-10-PCS | Mod: ,,, | Performed by: STUDENT IN AN ORGANIZED HEALTH CARE EDUCATION/TRAINING PROGRAM

## 2023-07-17 PROCEDURE — 85025 COMPLETE CBC W/AUTO DIFF WBC: CPT | Performed by: STUDENT IN AN ORGANIZED HEALTH CARE EDUCATION/TRAINING PROGRAM

## 2023-07-17 PROCEDURE — 63600175 PHARM REV CODE 636 W HCPCS: Performed by: STUDENT IN AN ORGANIZED HEALTH CARE EDUCATION/TRAINING PROGRAM

## 2023-07-17 PROCEDURE — 99239 HOSP IP/OBS DSCHRG MGMT >30: CPT | Mod: ,,, | Performed by: STUDENT IN AN ORGANIZED HEALTH CARE EDUCATION/TRAINING PROGRAM

## 2023-07-17 PROCEDURE — 96376 TX/PRO/DX INJ SAME DRUG ADON: CPT

## 2023-07-17 PROCEDURE — 80053 COMPREHEN METABOLIC PANEL: CPT | Performed by: STUDENT IN AN ORGANIZED HEALTH CARE EDUCATION/TRAINING PROGRAM

## 2023-07-17 PROCEDURE — 25000003 PHARM REV CODE 250: Performed by: STUDENT IN AN ORGANIZED HEALTH CARE EDUCATION/TRAINING PROGRAM

## 2023-07-17 PROCEDURE — 83735 ASSAY OF MAGNESIUM: CPT | Performed by: STUDENT IN AN ORGANIZED HEALTH CARE EDUCATION/TRAINING PROGRAM

## 2023-07-17 PROCEDURE — 84100 ASSAY OF PHOSPHORUS: CPT | Performed by: STUDENT IN AN ORGANIZED HEALTH CARE EDUCATION/TRAINING PROGRAM

## 2023-07-17 PROCEDURE — A4216 STERILE WATER/SALINE, 10 ML: HCPCS | Performed by: STUDENT IN AN ORGANIZED HEALTH CARE EDUCATION/TRAINING PROGRAM

## 2023-07-17 PROCEDURE — G0378 HOSPITAL OBSERVATION PER HR: HCPCS

## 2023-07-17 PROCEDURE — 36415 COLL VENOUS BLD VENIPUNCTURE: CPT | Performed by: STUDENT IN AN ORGANIZED HEALTH CARE EDUCATION/TRAINING PROGRAM

## 2023-07-17 RX ORDER — KETOROLAC TROMETHAMINE 10 MG/1
10 TABLET, FILM COATED ORAL EVERY 6 HOURS
Qty: 20 TABLET | Refills: 0 | Status: SHIPPED | OUTPATIENT
Start: 2023-07-17 | End: 2023-07-22

## 2023-07-17 RX ADMIN — FAMOTIDINE 40 MG: 20 TABLET, FILM COATED ORAL at 08:07

## 2023-07-17 RX ADMIN — Medication 10 ML: at 05:07

## 2023-07-17 RX ADMIN — ATORVASTATIN CALCIUM 10 MG: 10 TABLET, FILM COATED ORAL at 08:07

## 2023-07-17 RX ADMIN — KETOROLAC TROMETHAMINE 30 MG: 30 INJECTION, SOLUTION INTRAMUSCULAR; INTRAVENOUS at 08:07

## 2023-07-17 RX ADMIN — BUPROPION HYDROCHLORIDE 300 MG: 300 TABLET, FILM COATED, EXTENDED RELEASE ORAL at 06:07

## 2023-07-17 RX ADMIN — GABAPENTIN 300 MG: 300 CAPSULE ORAL at 08:07

## 2023-07-17 NOTE — PLAN OF CARE
[facility]  Discharge Final Note    Primary Care Provider: Jasen Francois NP    Expected Discharge Date: 7/17/2023    Patient discharged to home via personal transportation.     Patient's bedside nurse and patient notified of the above.      Final Discharge Note (most recent)       Final Note - 07/17/23 1116          Final Note    Assessment Type Final Discharge Note (P)      Anticipated Discharge Disposition Home or Self Care (P)         Post-Acute Status    Post-Acute Authorization Other (P)      Other Status No Post-Acute Service Needs (P)                      Important Message from Medicare             Contact Info       Jasen Francois NP   Specialty: Family Medicine   Relationship: PCP - General    [place] [facility]  [address]  [place] [address]       Next Steps: Follow up on 7/20/2023    Instructions: Hospital Follow Up at 10:30AM; Bring Discharge Summary, ID, Insurance Card, and Medication List    [facility] 4th Fl   Specialty: Gastroenterology    [address]  [place] [address]   Phone: [phone]       Next Steps: Schedule an appointment as soon as possible for a visit            No future appointments.    SW scheduled post-discharge follow-up appointment and information added to AVS.     Ivet Desai LMSW  [facility]  Ext. [phone]

## 2023-07-17 NOTE — DISCHARGE SUMMARY
[facility] [address] [place] [address] Medicine  Discharge Summary      Patient Name: Matthew Austin  MRN: [id number]  JB: [id number]  Patient Class: OP- Observation  Admission Date: 7/15/2023  Hospital Length of Stay: 0 days  Discharge Date and Time:  07/17/2023 9:04 AM  Attending Physician: Dontae Irby MD   Discharging Provider: Dontae Irby MD  Primary Care Provider: Jasen Francois NP  Hospital Medicine Team: [facility] Dontae Irby MD  Primary Care Team: [facility]    HPI:   Matthew Austin is a 34yo M with a PMH of HTN, HLD, PACHECO, GERD, and MDD who presented to the [facility] ED on 7/15/23 with complaints of RLQ pain. Pt reports pain started 1 day PTA, first was dull in sensation, then progressed to overt pain. Ranked 10/10 in severity, no radiation. Denies F/C/N/V/D/C, HA, SOB, CP, palpitations, dysuria, extremity swelling, or symptoms otherwise. Given lack of improvement, he came to the ER.    In the ED, VSS. Labs showed WBCs 14.97 and lipase 211. CT abd and pelvis showed right lower quadrant inflammatory lymphadenopathy as well as an adjacent enlarged lymph node along the SMA, as above; the appendix appears within normal limits; these findings are favored to represent mesenteric adenitis. Abd US showed Mild hepatosplenomegaly with diffuse hepatic steatosis. ED gave IV morphine, though pain persisted. Hospital medicine was consulted for admission and further management.      * No surgery found *      Hospital Course:   Pt admitted to [facility] and started on IVF and PRN analgesics. Pain improving into 7/16, and leukocytosis resolved. Pt continued to do well into 7/17 and was deemed appropriate for discharge. Plan discussed with pt, who was agreeable and amenable; medications were discussed and reviewed, outpatient follow-up scheduled, ER precautions were given, all questions were answered to the pt's satisfaction, and Mr. Austin was subsequently discharged.         Goals of Care Treatment  Preferences:  Code Status: Full Code      Consults:     No new Assessment & Plan notes have been filed under this hospital service since the last note was generated.  Service: Hospital Medicine    Final Active Diagnoses:    Diagnosis Date Noted POA    PRINCIPAL PROBLEM:  Mesenteric adenitis [I88.0] 07/15/2023 Yes    Right lower quadrant abdominal pain [R10.31] 07/15/2023 Yes    Hepatosplenomegaly [R16.2] 07/15/2023 Yes    Hepatic steatosis [K76.0] 01/21/2021 Yes    Essential hypertension [I10] 07/15/2023 Yes    Mixed hyperlipidemia [E78.2] 01/21/2021 Yes    Gastroesophageal reflux disease without esophagitis [K21.9] 07/15/2023 Yes    Recurrent major depressive disorder, in partial remission [F33.41] 07/15/2023 Yes      Problems Resolved During this Admission:       Discharged Condition: stable    Disposition: Home or Self Care    Follow Up:   Follow-up Information     Jasen Francois NP. Schedule an appointment as soon as possible for a visit .    Specialty: Family Medicine  Contact information:  [address]  [place] [address]             [facility] 4th Fl. Schedule an appointment as soon as possible for a visit .    Specialty: Gastroenterology  Contact information:  [facility] [address] [phone]  Additional information:  [facility] - Main Building, 4th Floor   Please park in [facility] and take [facility] elevator           Jasen Francois NP Follow up.    Specialty: Family Medicine  Contact information:  [address]  [place] [address]                       Patient Instructions:      Ambulatory referral/consult to Family Practice   Standing Status: Future   Referral Priority: Routine Referral Type: Consultation   Referral Reason: Specialty Services Required   Requested Specialty: Family Medicine   Number of Visits Requested: 1     Diet Cardiac     Notify your health care provider if you experience any of the following:  increased  confusion or weakness     Notify your health care provider if you experience any of the following:  persistent dizziness, light-headedness, or visual disturbances     Notify your health care provider if you experience any of the following:  worsening rash     Notify your health care provider if you experience any of the following:  severe persistent headache     Notify your health care provider if you experience any of the following:  difficulty breathing or increased cough     Notify your health care provider if you experience any of the following:  severe uncontrolled pain     Notify your health care provider if you experience any of the following:  persistent nausea and vomiting or diarrhea     Notify your health care provider if you experience any of the following:  temperature >100.4     Activity as tolerated       Significant Diagnostic Studies: Labs: All labs within the past 24 hours have been reviewed    Pending Diagnostic Studies:     None         Medications:  Reconciled Home Medications:      Medication List      START taking these medications    ketorolac 10 mg tablet  Commonly known as: TORADOL  Take 1 tablet (10 mg total) by mouth every 6 (six) hours. for 5 days        CONTINUE taking these medications    ALPRAZolam 1 MG tablet  Commonly known as: XANAX  Take 1 mg by mouth once daily.     AMITIZA ORAL  Take 1 tablet by mouth daily as needed (constipation).     amLODIPine 5 MG tablet  Commonly known as: NORVASC  Take 5 mg by mouth once daily.     atorvastatin 40 MG tablet  Commonly known as: LIPITOR  Take 40 mg by mouth once daily.     baclofen 20 MG tablet  Commonly known as: LIORESAL  Take 20 mg by mouth 3 (three) times daily as needed (muscle spasm).     buPROPion 300 MG 24 hr tablet  Commonly known as: WELLBUTRIN XL  Take 300 mg by mouth once daily.     citalopram 20 MG tablet  Commonly known as: CeleXA  Take 20 mg by mouth once daily.     dextroamphetamine-amphetamine 30 mg Tab  TAKE 1 TABLET BY  MOUTH TWICE DAILY     gabapentin 300 MG capsule  Commonly known as: NEURONTIN  Take 300 mg by mouth 3 (three) times daily as needed (pain).     hydroCHLOROthiazide 25 MG tablet  Commonly known as: HYDRODIURIL  Take 25 mg by mouth once daily.     linaCLOtide 145 mcg Cap capsule  Commonly known as: LINZESS  Take 145 mcg by mouth daily as needed (constipation).     loratadine 10 mg tablet  Commonly known as: CLARITIN  Take 10 mg by mouth daily as needed for Allergies.     losartan 100 MG tablet  Commonly known as: COZAAR  Take 100 mg by mouth once daily.     pantoprazole 40 MG tablet  Commonly known as: PROTONIX  Take 40 mg by mouth once daily.     pimecrolimus 1 % cream  Commonly known as: ELIDEL  Apply topically 2 (two) times daily.     pregabalin 75 MG capsule  Commonly known as: LYRICA  Take 75 mg by mouth 2 (two) times daily as needed (pain).     semaglutide 1 mg/dose (4 mg/3 mL)  Commonly known as: OZEMPIC  Inject 1 mg into the skin every Thursday.        STOP taking these medications    meloxicam 15 MG tablet  Commonly known as: MOBIC            Indwelling Lines/Drains at time of discharge:   Lines/Drains/Airways     None                 Time spent on the discharge of patient: 35 minutes         Dontae Irby MD  Attending Physician  Medical Director - [facility] Observation Unit  Department of Hospital Medicine  7/17/2023

## 2023-07-17 NOTE — NURSING
Pt AAOX4. No distress noted. LR infusing at 125 ml/hr.  Bed in lowest position. Side rails up x2. Call bell and personal belongs within reach. Bed alarm activated. Safety precautions maintained. Pt free of falls or injuries. No further issues or concerns at this time. Plan of care continues.

## 2023-07-19 LAB
BARTONELLA DNA BLD QL NAA+PROBE: NEGATIVE
SPECIMEN SOURCE: NORMAL

## 2023-07-26 ENCOUNTER — HOSPITAL ENCOUNTER (EMERGENCY)
Facility: HOSPITAL | Age: 33
Discharge: HOME | End: 2023-07-26
Attending: EMERGENCY MEDICINE
Payer: MEDICAID

## 2023-07-26 VITALS
OXYGEN SATURATION: 99 % | TEMPERATURE: 98 F | DIASTOLIC BLOOD PRESSURE: 88 MMHG | HEART RATE: 78 BPM | SYSTOLIC BLOOD PRESSURE: 154 MMHG | HEIGHT: 64 IN | RESPIRATION RATE: 20 BRPM | WEIGHT: 160 LBS | BODY MASS INDEX: 27.31 KG/M2

## 2023-07-26 DIAGNOSIS — N17.9 AKI (ACUTE KIDNEY INJURY): ICD-10-CM

## 2023-07-26 DIAGNOSIS — S39.91XA BLUNT ABDOMINAL TRAUMA, INITIAL ENCOUNTER: ICD-10-CM

## 2023-07-26 DIAGNOSIS — S90.415A ABRASION OF TOE OF LEFT FOOT, INITIAL ENCOUNTER: ICD-10-CM

## 2023-07-26 DIAGNOSIS — M25.775 OSTEOPHYTE OF LEFT FOOT: ICD-10-CM

## 2023-07-26 DIAGNOSIS — S29.8XXA BLUNT TRAUMA TO CHEST, INITIAL ENCOUNTER: ICD-10-CM

## 2023-07-26 DIAGNOSIS — S69.91XA HAND INJURY, RIGHT, INITIAL ENCOUNTER: ICD-10-CM

## 2023-07-26 DIAGNOSIS — S29.9XXA ACUTE TRAUMATIC INJURY OF CHEST WALL: ICD-10-CM

## 2023-07-26 DIAGNOSIS — Z23 NEED FOR TD VACCINE: ICD-10-CM
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
## 2023-07-26 LAB
ALBUMIN SERPL BCP-MCNC: 3.9 G/DL (ref 3.5–5.2)
ALP SERPL-CCNC: 74 U/L (ref 55–135)
ALT SERPL W/O P-5'-P-CCNC: 55 U/L (ref 10–44)
ANION GAP SERPL CALC-SCNC: 11 MMOL/L (ref 8–16)
AST SERPL-CCNC: 28 U/L (ref 10–40)
BASOPHILS # BLD AUTO: 0.05 K/UL (ref 0–0.2)
BASOPHILS NFR BLD: 0.3 % (ref 0–1.9)
BILIRUB SERPL-MCNC: 0.4 MG/DL (ref 0.1–1)
BILIRUB UR QL STRIP: NEGATIVE
BUN SERPL-MCNC: 25 MG/DL (ref 6–20)
CALCIUM SERPL-MCNC: 9.1 MG/DL (ref 8.7–10.5)
CHLORIDE SERPL-SCNC: 101 MMOL/L (ref 95–110)
CLARITY UR REFRACT.AUTO: CLEAR
CO2 SERPL-SCNC: 28 MMOL/L (ref 23–29)
COLOR UR AUTO: ABNORMAL
CREAT SERPL-MCNC: 1.8 MG/DL (ref 0.5–1.4)
DIFFERENTIAL METHOD: ABNORMAL
EOSINOPHIL # BLD AUTO: 0.1 K/UL (ref 0–0.5)
EOSINOPHIL NFR BLD: 1 % (ref 0–8)
ERYTHROCYTE [DISTWIDTH] IN BLOOD BY AUTOMATED COUNT: 12.4 % (ref 11.5–14.5)
EST. GFR  (NO RACE VARIABLE): 49.7 ML/MIN/1.73 M^2
GLUCOSE SERPL-MCNC: 71 MG/DL (ref 70–110)
GLUCOSE UR QL STRIP: NEGATIVE
HCT VFR BLD AUTO: 44 % (ref 40–54)
HGB BLD-MCNC: 15.5 G/DL (ref 14–18)
HGB UR QL STRIP: NEGATIVE
IMM GRANULOCYTES # BLD AUTO: 0.05 K/UL (ref 0–0.04)
IMM GRANULOCYTES NFR BLD AUTO: 0.3 % (ref 0–0.5)
KETONES UR QL STRIP: NEGATIVE
LEUKOCYTE ESTERASE UR QL STRIP: NEGATIVE
LYMPHOCYTES # BLD AUTO: 2.9 K/UL (ref 1–4.8)
LYMPHOCYTES NFR BLD: 20.1 % (ref 18–48)
MCH RBC QN AUTO: 31.9 PG (ref 27–31)
MCHC RBC AUTO-ENTMCNC: 35.2 G/DL (ref 32–36)
MCV RBC AUTO: 91 FL (ref 82–98)
MONOCYTES # BLD AUTO: 1 K/UL (ref 0.3–1)
MONOCYTES NFR BLD: 7.1 % (ref 4–15)
NEUTROPHILS # BLD AUTO: 10.2 K/UL (ref 1.8–7.7)
NEUTROPHILS NFR BLD: 71.2 % (ref 38–73)
NITRITE UR QL STRIP: NEGATIVE
NRBC BLD-RTO: 0 /100 WBC
PH UR STRIP: 5 [PH] (ref 5–8)
PLATELET # BLD AUTO: 389 K/UL (ref 150–450)
PMV BLD AUTO: 10.9 FL (ref 9.2–12.9)
POCT GLUCOSE: 127 MG/DL (ref 70–110)
POTASSIUM SERPL-SCNC: 3.5 MMOL/L (ref 3.5–5.1)
PROT SERPL-MCNC: 6.9 G/DL (ref 6–8.4)
PROT UR QL STRIP: NEGATIVE
RBC # BLD AUTO: 4.86 M/UL (ref 4.6–6.2)
SODIUM SERPL-SCNC: 140 MMOL/L (ref 136–145)
SP GR UR STRIP: >=1.03 (ref 1–1.03)
URN SPEC COLLECT METH UR: ABNORMAL
WBC # BLD AUTO: 14.33 K/UL (ref 3.9–12.7)

## 2023-07-26 PROCEDURE — 96361 HYDRATE IV INFUSION ADD-ON: CPT

## 2023-07-26 PROCEDURE — 63600175 PHARM REV CODE 636 W HCPCS: Performed by: PHYSICIAN ASSISTANT

## 2023-07-26 PROCEDURE — 99285 EMERGENCY DEPT VISIT HI MDM: CPT | Mod: 25

## 2023-07-26 PROCEDURE — 25000003 PHARM REV CODE 250: Performed by: PHYSICIAN ASSISTANT

## 2023-07-26 PROCEDURE — 96375 TX/PRO/DX INJ NEW DRUG ADDON: CPT | Mod: 59

## 2023-07-26 PROCEDURE — 90715 TDAP VACCINE 7 YRS/> IM: CPT | Performed by: PHYSICIAN ASSISTANT

## 2023-07-26 PROCEDURE — 96374 THER/PROPH/DIAG INJ IV PUSH: CPT | Mod: 59

## 2023-07-26 PROCEDURE — 80053 COMPREHEN METABOLIC PANEL: CPT | Performed by: EMERGENCY MEDICINE

## 2023-07-26 PROCEDURE — 25500020 PHARM REV CODE 255: Performed by: EMERGENCY MEDICINE

## 2023-07-26 PROCEDURE — 85025 COMPLETE CBC W/AUTO DIFF WBC: CPT | Performed by: PHYSICIAN ASSISTANT

## 2023-07-26 PROCEDURE — 82962 GLUCOSE BLOOD TEST: CPT

## 2023-07-26 PROCEDURE — 81003 URINALYSIS AUTO W/O SCOPE: CPT | Performed by: PHYSICIAN ASSISTANT

## 2023-07-26 PROCEDURE — 90471 IMMUNIZATION ADMIN: CPT | Performed by: PHYSICIAN ASSISTANT

## 2023-07-26 RX ORDER — HYDROCODONE BITARTRATE AND ACETAMINOPHEN 5; 325 MG/1; MG/1
1 TABLET ORAL EVERY 6 HOURS PRN
Qty: 12 TABLET | Refills: 0 | Status: SHIPPED | OUTPATIENT
Start: 2023-07-26

## 2023-07-26 RX ORDER — IBUPROFEN 800 MG/1
800 TABLET ORAL EVERY 6 HOURS PRN
Qty: 20 TABLET | Refills: 0 | Status: SHIPPED | OUTPATIENT
Start: 2023-07-26

## 2023-07-26 RX ORDER — MORPHINE SULFATE 4 MG/ML
4 INJECTION, SOLUTION INTRAMUSCULAR; INTRAVENOUS
Status: COMPLETED | OUTPATIENT
Start: 2023-07-26 | End: 2023-07-26

## 2023-07-26 RX ORDER — ONDANSETRON 2 MG/ML
4 INJECTION INTRAMUSCULAR; INTRAVENOUS
Status: COMPLETED | OUTPATIENT
Start: 2023-07-26 | End: 2023-07-26

## 2023-07-26 RX ORDER — LIDOCAINE 50 MG/G
1 PATCH TOPICAL DAILY PRN
Qty: 14 PATCH | Refills: 0 | Status: SHIPPED | OUTPATIENT
Start: 2023-07-26

## 2023-07-26 RX ORDER — HYDROCODONE BITARTRATE AND ACETAMINOPHEN 5; 325 MG/1; MG/1
1 TABLET ORAL
Status: COMPLETED | OUTPATIENT
Start: 2023-07-26 | End: 2023-07-26

## 2023-07-26 RX ORDER — CYCLOBENZAPRINE HCL 10 MG
10 TABLET ORAL 3 TIMES DAILY PRN
Qty: 15 TABLET | Refills: 0 | Status: SHIPPED | OUTPATIENT
Start: 2023-07-26 | End: 2023-07-31

## 2023-07-26 RX ADMIN — TETANUS TOXOID, REDUCED DIPHTHERIA TOXOID AND ACELLULAR PERTUSSIS VACCINE, ADSORBED 0.5 ML: 5; 2.5; 8; 8; 2.5 SUSPENSION INTRAMUSCULAR at 03:07

## 2023-07-26 RX ADMIN — BACITRACIN ZINC, NEOMYCIN, POLYMYXIN B: 400; 3.5; 5 OINTMENT TOPICAL at 03:07

## 2023-07-26 RX ADMIN — ONDANSETRON 4 MG: 2 INJECTION INTRAMUSCULAR; INTRAVENOUS at 03:07

## 2023-07-26 RX ADMIN — IOHEXOL 100 ML: 350 INJECTION, SOLUTION INTRAVENOUS at 04:07

## 2023-07-26 RX ADMIN — MORPHINE SULFATE 4 MG: 4 INJECTION INTRAVENOUS at 03:07

## 2023-07-26 RX ADMIN — HYDROCODONE BITARTRATE AND ACETAMINOPHEN 1 TABLET: 5; 325 TABLET ORAL at 06:07

## 2023-07-26 RX ADMIN — SODIUM CHLORIDE 1000 ML: 9 INJECTION, SOLUTION INTRAVENOUS at 07:07

## 2023-07-26 NOTE — ED NOTES
Patient identifiers for Matthew Austin 33 y.o. male checked and correct.  Chief Complaint   Patient presents with    Fall     Fell off a ladder unsure of how high he was on the ladder, states it was 10ft but wasn't on the top step , complaining of right rib pain that worsens with inspiration and movement     Past Medical History:   Diagnosis Date    Anxiety     Cervical spondylosis with radiculopathy     Depression     History of psychiatric hospitalization     HLD (hyperlipidemia)     HTN (hypertension)     Hx of psychiatric care     Hypertension     Elayne     Psychiatric problem     Suicide attempt     Suicide attempt by substance overdose 6/25/2017    Therapy      Allergies reported: Review of patient's allergies indicates:  No Known Allergies      HEENT: Denies vision changes. Denies ear drainage or hearing loss. No c/o nasal drainage. Denies dysphagia or voice changes.   Appearance: Pt awake, alert & oriented to person, place & time. Pt in no acute distress at present time. Pt is clean and well groomed with clothes appropriately fastened.   Skin: Skin warm, dry & intact. Color consistent with ethnicity. Mucous membranes moist. No breakdown or brusing noted.   Musculoskeletal: Patient moving all extremities well, no obvious swelling or deformities noted.   Respiratory: SOB and abdominal breathing noted. Reports R-sided rib pain. Reports R thumb pain. Visible chest rise noted. Airway is open and patent.   Neurologic: Sensation is intact. Speech is clear and appropriate. Eyes open spontaneously, behavior appropriate to situation, follows commands, facial expression symmetrical, bilateral hand grasp equal and even, purposeful motor response noted.  Cardiac: All peripheral pulses present. No Bilateral lower extremity edema. Cap refill is <3 seconds.  Abdomen: Abdomen soft, non distended, non tender to palpation.   : Pt voids independently, denies dysuria, hematuria, frequency.

## 2023-07-26 NOTE — PROVIDER PROGRESS NOTES - EMERGENCY DEPT.
Encounter Date: 7/26/2023    ED Physician Progress Notes        Physician Note:   I received sign of of this patient due to shift charge from Burt Stark PA-C.  Chest abdomen pelvis significant for right upper quadrant anterior wall subcutaneous fat stranding.  Patient has point tenderness in this area.  It is worse with movement.  Most likely due to trauma.  Left foot imaging concerning for great toe fracture versus osteophyte.  No point tenderness noted on examination.  Patient has full range of motion of left great toe.  Likely an osteophyte.  Creatinine at 1.8.  Patient has normal kidney function at baseline.  Given 1 L of normal saline.  Creatinine improved to 0.7 on i-STAT.  No acute fractures or dislocation noted.  Vital significant for hypertension.  He is resting comfortably on my examination. I believe patient is stable at this time for discharge.  Discussed pain control at home. Discharged with short course of opoid pain medication for polytrauma.  Counseling was given on opioid pain medication and side effects.  Strict ED precautions were given to return for new or worsening symptoms

## 2023-07-26 NOTE — ED PROVIDER NOTES
Encounter Date: 7/26/2023       History     Chief Complaint   Patient presents with    Fall     Fell off a ladder unsure of how high he was on the ladder, states it was 10ft but wasn't on the top step , complaining of right rib pain that worsens with inspiration and movement     The patient is a 33 year old male.  His documented past medical history significant for HTN, HLD, PACHECO, GERD, hepatosplenomegaly and psychiatric illness.  He was just discharged from the hospital on 07/17 following an admission for treatment of acute mesenteric adenitis.  He returns to the ER today for emergent evaluation due to falling off of a step ladder while painting inside of his home.  He states that he was at least 3-4 feet up when the ladder buckled while he was reaching over with a paint roller causing a fall that occurred just prior to arrival.  He states that when he fell he landed directly on top of the ladder which impacted his right ribs and right upper abdomen.  He states that he has significant abdominal pain and rib pain.  He states that her hurts to take a deep breath.  He states that any movement straining or jarring exacerbates his pain.  He also reports minor injuries of his right thumb and left foot.  He has multiple minor/superficial bleeding abrasions to multiple left toes.  He denies hitting his head.  He denies any neck or back pain.  He has been ambulatory since the fall.  He has not tried anything for pain.  He has not had a tetanus vaccine in greater than 5 years.    Review of patient's allergies indicates:  No Known Allergies  Past Medical History:   Diagnosis Date    Anxiety     Cervical spondylosis with radiculopathy     Depression     History of psychiatric hospitalization     HLD (hyperlipidemia)     HTN (hypertension)     Hx of psychiatric care     Hypertension     Elayne     Psychiatric problem     Suicide attempt     Suicide attempt by substance overdose 6/25/2017    Therapy      No past surgical history  on file.  No family history on file.  Social History     Tobacco Use    Smoking status: Former     Packs/day: 1.00     Years: 3.00     Pack years: 3.00     Types: Cigarettes    Smokeless tobacco: Never   Substance Use Topics    Alcohol use: Yes     Comment: no daily use, ~ 1-4 servings weekly    Drug use: Not Currently     Types: Marijuana     Review of Systems   Constitutional:  Negative for diaphoresis.   HENT:  Negative for facial swelling.    Eyes:  Negative for visual disturbance.   Respiratory:  Negative for shortness of breath.    Cardiovascular:  Negative for chest pain.   Gastrointestinal:  Positive for abdominal pain.   Genitourinary:  Negative for decreased urine volume, flank pain and hematuria.   Musculoskeletal:  Positive for arthralgias. Negative for back pain and gait problem.   Skin:  Positive for wound.   Allergic/Immunologic: Negative for immunocompromised state.   Neurological:  Negative for seizures, syncope, speech difficulty, light-headedness, numbness and headaches.   Hematological:  Negative for adenopathy.   Psychiatric/Behavioral:  Negative for behavioral problems and confusion.      Physical Exam     Initial Vitals [07/26/23 1356]   BP Pulse Resp Temp SpO2   128/64 108 16 98.2 °F (36.8 °C) 97 %      MAP       --         Physical Exam    Nursing note and vitals reviewed.  Constitutional:   Alert interactive.  Appears uncomfortable.  Accompanied by his mother.   HENT:   Head: Normocephalic and atraumatic.   No facial swelling.  No scalp swelling or tenderness.  Atraumatic   Eyes:   Atraumatic   Neck:   Nontender   Cardiovascular:  Normal rate and intact distal pulses.           Pulmonary/Chest: No respiratory distress.   Diffuse tenderness palpation of right lower anterior and lateral ribs; no obvious deformity or step-off.  Significant point tenderness to palpation over xiphoid process.   Abdominal: Abdomen is soft.   There is localized pain to palpation of right upper quadrant of abdomen.    Musculoskeletal:      Comments: Right hand:  Pain to palpation right thenar eminence, mild; no obvious deformity, no significant swelling or ecchymosis noted, full range of motion observed  Left foot:  There are several small superficial acute abrasions to the dorsal aspects of the 2nd 3rd and 4th toes.  No obvious deformity.  No focal bony point tenderness.  No T or L spine tenderness to palpation.     Neurological: He is alert and oriented to person, place, and time. He has normal strength. No sensory deficit. GCS score is 15. GCS eye subscore is 4. GCS verbal subscore is 5. GCS motor subscore is 6.   Skin: Skin is warm and dry.   Psychiatric: He has a normal mood and affect. His behavior is normal.       ED Course   Procedures  Labs Reviewed   CBC W/ AUTO DIFFERENTIAL - Abnormal; Notable for the following components:       Result Value    WBC 14.33 (*)     MCH 31.9 (*)     Gran # (ANC) 10.2 (*)     Immature Grans (Abs) 0.05 (*)     All other components within normal limits   URINALYSIS, REFLEX TO URINE CULTURE - Abnormal; Notable for the following components:    Specific Gravity, UA >=1.030 (*)     All other components within normal limits    Narrative:     Specimen Source->Urine   COMPREHENSIVE METABOLIC PANEL - Abnormal; Notable for the following components:    BUN 25 (*)     Creatinine 1.8 (*)     ALT 55 (*)     eGFR 49.7 (*)     All other components within normal limits   POCT GLUCOSE - Abnormal; Notable for the following components:    POCT Glucose 127 (*)     All other components within normal limits     Results for orders placed or performed during the hospital encounter of 07/26/23   CBC auto differential   Result Value Ref Range    WBC 14.33 (H) 3.90 - 12.70 K/uL    RBC 4.86 4.60 - 6.20 M/uL    Hemoglobin 15.5 14.0 - 18.0 g/dL    Hematocrit 44.0 40.0 - 54.0 %    MCV 91 82 - 98 fL    MCH 31.9 (H) 27.0 - 31.0 pg    MCHC 35.2 32.0 - 36.0 g/dL    RDW 12.4 11.5 - 14.5 %    Platelets 389 150 - 450 K/uL     MPV 10.9 9.2 - 12.9 fL    Immature Granulocytes 0.3 0.0 - 0.5 %    Gran # (ANC) 10.2 (H) 1.8 - 7.7 K/uL    Immature Grans (Abs) 0.05 (H) 0.00 - 0.04 K/uL    Lymph # 2.9 1.0 - 4.8 K/uL    Mono # 1.0 0.3 - 1.0 K/uL    Eos # 0.1 0.0 - 0.5 K/uL    Baso # 0.05 0.00 - 0.20 K/uL    nRBC 0 0 /100 WBC    Gran % 71.2 38.0 - 73.0 %    Lymph % 20.1 18.0 - 48.0 %    Mono % 7.1 4.0 - 15.0 %    Eosinophil % 1.0 0.0 - 8.0 %    Basophil % 0.3 0.0 - 1.9 %    Differential Method Automated    Urinalysis, Reflex to Urine Culture Urine, Clean Catch    Specimen: Urine   Result Value Ref Range    Specimen UA Urine, Clean Catch     Color, UA Straw Yellow, Straw, Donna    Appearance, UA Clear Clear    pH, UA 5.0 5.0 - 8.0    Specific Gravity, UA >=1.030 (A) 1.005 - 1.030    Protein, UA Negative Negative    Glucose, UA Negative Negative    Ketones, UA Negative Negative    Bilirubin (UA) Negative Negative    Occult Blood UA Negative Negative    Nitrite, UA Negative Negative    Leukocytes, UA Negative Negative   Comprehensive metabolic panel   Result Value Ref Range    Sodium 140 136 - 145 mmol/L    Potassium 3.5 3.5 - 5.1 mmol/L    Chloride 101 95 - 110 mmol/L    CO2 28 23 - 29 mmol/L    Glucose 71 70 - 110 mg/dL    BUN 25 (H) 6 - 20 mg/dL    Creatinine 1.8 (H) 0.5 - 1.4 mg/dL    Calcium 9.1 8.7 - 10.5 mg/dL    Total Protein 6.9 6.0 - 8.4 g/dL    Albumin 3.9 3.5 - 5.2 g/dL    Total Bilirubin 0.4 0.1 - 1.0 mg/dL    Alkaline Phosphatase 74 55 - 135 U/L    AST 28 10 - 40 U/L    ALT 55 (H) 10 - 44 U/L    eGFR 49.7 (A) >60 mL/min/1.73 m^2    Anion Gap 11 8 - 16 mmol/L   POCT glucose   Result Value Ref Range    POCT Glucose 127 (H) 70 - 110 mg/dL            Imaging Results              X-Ray Sternum (Final result)  Result time 07/26/23 18:01:09      Final result by Dick Horner MD (07/26/23 18:01:09)                   Impression:      No acute radiographic abnormality.      Electronically signed by: Dick  Vita  Date:    07/26/2023  Time:    18:01               Narrative:    EXAMINATION:  XR STERNUM PA AND LATERAL    CLINICAL HISTORY:  Fall on and from ladder, initial encounter    TECHNIQUE:  Two views of the sternum    COMPARISON:  07/26/2023    FINDINGS:  No acute fracture, subluxation or dislocation.  No mass or foreign body.                                       X-Ray Chest PA And Lateral (Final result)  Result time 07/26/23 18:03:09      Final result by Dick Horner MD (07/26/23 18:03:09)                   Impression:      No acute abnormality.      Electronically signed by: Dcik Horner  Date:    07/26/2023  Time:    18:03               Narrative:    EXAMINATION:  XR CHEST PA AND LATERAL    CLINICAL HISTORY:  Fall on and from ladder, initial encounter    TECHNIQUE:  PA and lateral views of the chest were performed.    COMPARISON:  09/19/2021    FINDINGS:  The lungs are clear, with normal appearance of pulmonary vasculature and no pleural effusion or pneumothorax.    The cardiac silhouette is normal in size. The hilar and mediastinal contours are unremarkable.    Bones are intact.                                       X-Ray Ribs 2 View Right (Final result)  Result time 07/26/23 18:09:47      Final result by Dick Horner MD (07/26/23 18:09:47)                   Impression:      No acute radiographic abnormality.      Electronically signed by: Dick Horner  Date:    07/26/2023  Time:    18:09               Narrative:    EXAMINATION:  XR RIBS 2 VIEW RIGHT    CLINICAL HISTORY:  Fall on and from ladder, initial encounter    TECHNIQUE:  Two views of the right ribs were performed.    COMPARISON:  None    FINDINGS:  No acute rib fractures are detected.  Right hemithorax is clear.  No mass or foreign body.  No osseous destruction.                                       X-Ray Hand 3 view Right (Final result)  Result time 07/26/23 18:12:12      Final result by Dick Horner MD (07/26/23 18:12:12)                    Impression:      No acute radiographic abnormality.      Electronically signed by: Dick Horner  Date:    07/26/2023  Time:    18:12               Narrative:    EXAMINATION:  XR HAND COMPLETE 3 VIEW RIGHT    CLINICAL HISTORY:  fall ladder;    TECHNIQUE:  PA, lateral, and oblique views of the right hand were performed.    COMPARISON:  None    FINDINGS:  No acute fracture, subluxation or dislocation.  No mass or foreign body.  No significant arthropathy.                                        X-Ray Foot Complete Left (Final result)  Result time 07/26/23 18:16:31      Final result by Dick Horner MD (07/26/23 18:16:31)                   Impression:      See above comments.  Recommend clinical correlation.    This report was flagged in Epic as abnormal.      Electronically signed by: Dick Horner  Date:    07/26/2023  Time:    18:16               Narrative:    EXAMINATION:  XR FOOT COMPLETE 3 VIEW LEFT    CLINICAL HISTORY:  .  Fall on and from ladder, initial encounter    TECHNIQUE:  AP, lateral and oblique views of the left foot were performed.    COMPARISON:  None    FINDINGS:  Possible small minimally displaced fracture fragment at the medial margin of the distal phalanx of the 1st digit versus a small osteophyte.  Recommend correlation with focused physical exam.  Joint spaces appear adequately maintained.  No significant abnormality elsewhere.    Probable chronic slight medial deviation of the 5th digit.                                       CT Chest Abdomen Pelvis With Contrast (xpd) (Final result)  Result time 07/26/23 17:49:13   Procedure changed from CT Abdomen Pelvis With Contrast     Final result by Santhosh Parker MD (07/26/23 17:49:13)                   Impression:      Minimal subcutaneous fat stranding over the right upper quadrant anterior abdominal wall and anterior left hip, may reflect sequela of reported trauma versus physiologic appearance.    Otherwise, no acute intrathoracic or  abdominopelvic abnormality.    Sequela of prior granulomatous disease.    Additional findings as above.    Electronically signed by resident: Tom Jarrell  Date:    07/26/2023  Time:    17:09    Electronically signed by: Santhosh Parker MD  Date:    07/26/2023  Time:    17:49               Narrative:    EXAMINATION:  CT CHEST ABDOMEN PELVIS WITH CONTRAST (XPD)    CLINICAL HISTORY:  Abdominal trauma, blunt;RUQ pain;    TECHNIQUE:  Low dose axial images were obtained from the thoracic inlet to the pubic symphysis following the intravenous administration of 100 cc of Omnipaque 350.  Delayed phase images also acquired.  Sagittal and coronal reformats were provided.    COMPARISON:  Abdominal ultrasound, CT abdomen pelvis 07/15/2023.    FINDINGS:  Thoracic soft tissues: Thyroid appears unremarkable.    Heart: No cardiomegaly or pericardial effusion.    Rosalina/Mediastinum: No significant lymphadenopathy    Lungs: Right lower lobe calcified granuloma.  No consolidation, pneumothorax, or pleural effusion.    Liver: Normal in size.  No focal hepatic abnormality.  Portal vein is patent.    Gallbladder: No calcified gallstones.  Phrygian cap, normal variant.    Bile Ducts: No intra or extrahepatic biliary ductal dilation.    Pancreas: No pancreatic mass lesion or peripancreatic inflammatory change.    Spleen: Upper normal size.    Adrenals: Unremarkable.    Kidneys/ Ureters: Normal in size and location.  Kidneys enhance symmetrically.  Punctate left renal hypodensity, too small to characterize.  No nephrolithiasis.  No hydroureteronephrosis.    Bladder: Smooth contours without bladder wall thickening.    Reproductive organs: Unremarkable.    GI Tract/Mesentery: The esophagus and stomach are normal in appearance.  No evidence for bowel obstruction.  Appendix appears unremarkable.    Peritoneal Space: No intraperitoneal free air or free fluid.    Lymph nodes: Prominent retroperitoneal lymph nodes, nonenlarged by size  criteria.    Abdominal wall/extraperitoneal soft tissues: Minimal subcutaneous fat stranding over the right upper quadrant anterior abdominal wall (series 2, image 121) and anterior left hip (series 2, image 204).    Vasculature: Left-sided aortic arch.  Thoracic and abdominal aorta are normal in caliber, contour, and course without significant calcific atherosclerosis.    Bones: No acute displaced fracture or bone destructive process.                                       Medications   neomycin-bacitracnZn-polymyxnB packet ( Topical (Top) Given by Other 7/26/23 1515)   Tdap (BOOSTRIX) vaccine injection 0.5 mL (0.5 mLs Intramuscular Given 7/26/23 1552)   morphine injection 4 mg (4 mg Intravenous Given 7/26/23 1548)   ondansetron injection 4 mg (4 mg Intravenous Given 7/26/23 1549)   iohexoL (OMNIPAQUE 350) injection 100 mL (100 mLs Intravenous Given 7/26/23 1653)   HYDROcodone-acetaminophen 5-325 mg per tablet 1 tablet (1 tablet Oral Given 7/26/23 1856)   sodium chloride 0.9% bolus 1,000 mL 1,000 mL (0 mLs Intravenous Stopped 7/26/23 2040)     Medical Decision Making:   History:   I obtained history from: someone other than patient.       <> Summary of History: History also obtained from interviewing patient's mother.  Chart review of records from recent admission also contributing.  Old Medical Records: I decided to obtain old medical records.  Initial Assessment:   33-year-old male presenting the ER for emergent evaluation following trauma.  Patient reports falling from height of 3-4 feet from a ladder while painting in his home just prior to arrival.  Patient states that he had landed directly on the ladder with a ladder striking his right abdomen and right ribs.  Differential Diagnosis:   Rib fracture, pneumothorax, rib contusion, sternal fracture, pulmonary contusion, liver laceration, abdominal contusion, internal injury, trauma, etc.  Clinical Tests:   Lab Tests: Ordered and Reviewed  Radiological Study:  Ordered and Reviewed  ED Management:  Vital signs reviewed, mild tachycardia noted   Records reviewed, patient with recent admission for acute mesenteric adenitis just discharged on 07/17  I discussed the case in detail with the ER attending physician  Patient with significant tenderness to palpation over right upper abdomen and right lower ribs, concern for possible internal injury such as liver laceration, pulmonary contusion, rib fracture, or pneumothorax considered, will get advanced imaging with CT to evaluate  Secondary complaint of injury to hand and foot/toes, will get plain radiographs to evaluate  [facility] will follow and disposition due to pending workup and end of my shift                            Clinical Impression:   Final diagnoses:  [W11.XXXA] Fall from ladder (Primary)  [S90.415A] Abrasion of toe of left foot, initial encounter  [S69.91XA] Hand injury, right, initial encounter  [Z23] Need for Td vaccine  [M25.775] Osteophyte of left foot  [S29.9XXA] Acute traumatic injury of chest wall  [N17.9] ANGEL (acute kidney injury)  [S39.91XA] Blunt abdominal trauma, initial encounter  [S29.8XXA] Blunt trauma to chest, initial encounter        ED Disposition Condition    Discharge Stable          ED Prescriptions       Medication Sig Dispense Start Date End Date Auth. Provider    HYDROcodone-acetaminophen (NORCO) 5-325 mg per tablet Take 1 tablet by mouth every 6 (six) hours as needed for Pain. 12 tablet 7/26/2023 -- Lili Ramires PA-C    cyclobenzaprine (FLEXERIL) 10 MG tablet Take 1 tablet (10 mg total) by mouth 3 (three) times daily as needed for Muscle spasms. 15 tablet 7/26/2023 7/31/2023 Lili Ramires PA-C    ibuprofen (ADVIL,MOTRIN) 800 MG tablet Take 1 tablet (800 mg total) by mouth every 6 (six) hours as needed for Pain. 20 tablet 7/26/2023 -- Lili Ramires PA-C    LIDOcaine (LIDODERM) 5 % Place 1 patch onto the skin daily as needed  (pain). Remove & Discard patch within 12 hours or as directed by MD 14 patch 7/26/2023 -- Lili Ramires PA-C          Follow-up Information    None          Burt Stark PA-C  07/27/23 2524     Patient information on fall and injury prevention

## 2023-07-27 NOTE — DISCHARGE INSTRUCTIONS
No acute fractures noted on imaging today   Possible left great toe osteophyte versus fracture noted though you denied tenderness on examination.  This is an osteophyte  Minimal amount of right upper quadrant anterior abdominal wall subcutaneous swelling noted.  This is most likely from your traumatic fall, though if your right upper quadrant pain does not improve please return to the emergency department for further evaluation  You were discharged with a prescription for Norco, Flexeril, ibuprofen.  Norco and flexeril are both sedating medications.  I do not recommend that you take them at the same time.  Do not drink alcohol or drive while taking these medications as these medications cause drowsiness.  You may take prescribed ibuprofen with either of the above medications  Take all medications as directed  Please follow-up with your primary care provider in 1 week to assess for healing  There is signs of kidney injury on your blood work.  Make sure you drink plenty of water  Return to the emergency department for new or concerning symptoms

## 2023-07-27 NOTE — ED NOTES
I-STAT Chem-8+ Results:   Value Reference Range   Sodium 153 136-145 mmol/L   Potassium  2.0 3.5-5.1 mmol/L   Chloride 119  mmol/L   Ionized Calcium 0.80 1.06-1.42 mmol/L   CO2 (measured) 15 23-29 mmol/L   Glucose 37  mg/dL   BUN 11 6-30 mg/dL   Creatinine 0.7 0.5-1.4 mg/dL   Hematocrit 22 36-54%

## 2023-09-01 DIAGNOSIS — M54.2 CERVICALGIA: Primary | ICD-10-CM

## 2023-10-11 ENCOUNTER — CLINICAL SUPPORT (OUTPATIENT)
Dept: REHABILITATION | Facility: HOSPITAL | Age: 33
End: 2023-10-11
Payer: MEDICAID

## 2023-10-11 DIAGNOSIS — R29.898 IMPAIRED STRENGTH OF NECK MUSCLES: ICD-10-CM

## 2023-10-11 DIAGNOSIS — R29.898 DECREASED ROM OF NECK: Primary | ICD-10-CM

## 2023-10-11 DIAGNOSIS — M62.81 MUSCLE WEAKNESS OF UPPER EXTREMITY: ICD-10-CM

## 2023-10-11 DIAGNOSIS — M54.2 CERVICALGIA: ICD-10-CM

## 2023-10-11 PROCEDURE — 97110 THERAPEUTIC EXERCISES: CPT | Mod: PO

## 2023-10-11 PROCEDURE — 97161 PT EVAL LOW COMPLEX 20 MIN: CPT | Mod: PO

## 2023-10-11 NOTE — PLAN OF CARE
[facility]   Physical Therapy Initial Evaluation      Name: Matthew Austin  [facility] Number: [id number]    Therapy Diagnosis:   Encounter Diagnoses   Name Primary?    Cervicalgia     Decreased ROM of neck Yes    Impaired strength of neck muscles     Muscle weakness of upper extremity         Physician: Jasen Francois NP    Physician Orders: PT Eval and Treat   Medical Diagnosis from Referral: Cervicalgia [M54.2]  Evaluation Date: 10/11/2023  Authorization Period Expiration: 8/31/24  Plan of Care Expiration: 1/11/24  Progress Note Due: 11/11/23  Date of Surgery: N/A  Visit # / Visits authorized: 1/ 1   FOTO: 1/ 3    Precautions: Standard     Time In: 2:00  Time Out: 2:34  Total Billable Time: 34 minutes    Subjective     Date of onset: 2 years    History of current condition - Lui reports: 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 This has been going on for about 2 years. It did go away for some time but returned around November-December and has gotten consistently worse. He has lost muscle on the left arm, has numbness/tingling in the left that stops at the wrist, and has difficulty sleeping. Lyrica, gabapentin, muscle relaxers, opiates do not help.     Falls: no    Imaging: see chart    Prior Therapy: yes, with positive outcomes  Social History:  lives alone  Occupation: student, working part time ()  Prior Level of Function: independent  Current Level of Function: trouble sleeping, standing/sitting upright for several hours, unable to lift things, not going to the gym    Pain:  Current 10/10, worst 10/10, best 10/10   Location: left neck   Description: numbness, tingling  Aggravating Factors: see current level of function  Easing Factors: lying supine    Patients goals: reduce pain in the neck, improve strength,      Medical History:   Past Medical History:   Diagnosis Date    Anxiety     Cervical spondylosis with radiculopathy     Depression     History of  psychiatric hospitalization     HLD (hyperlipidemia)     HTN (hypertension)     Hx of psychiatric care     Hypertension     Elayne     Psychiatric problem     Suicide attempt     Suicide attempt by substance overdose 6/25/2017    Therapy        Surgical History:   Matthew Austin  has no past surgical history on file.    Medications:   Matthew Tovar has a current medication list which includes the following prescription(s): alprazolam, amlodipine, atorvastatin, bupropion, citalopram, dextroamphetamine-amphetamine, gabapentin, hydrochlorothiazide, hydrocodone-acetaminophen, ibuprofen, lidocaine, linaclotide, loratadine, losartan, lubiprostone, pantoprazole, pimecrolimus, pregabalin, and semaglutide.    Allergies:   Review of patient's allergies indicates:  No Known Allergies     Objective      Observation: forward head, rounded shoulders    Posture: see above    Cervical Range of Motion:    Degrees(%) Pain   Flexion 40 yes     Extension 20 yes     Right Rotation 25% limited yes     Left Rotation 50% limited yes     Right Side Bending 20 yes   Left Side Bending 25 yes      Shoulder Range of Motion: tightness with range in the left shoulder, but normal range is present      Upper Extremity Strength  (R) UE  (L) UE    Shoulder flexion: 5/5 Shoulder flexion: 4-/5   Shoulder Abduction: 5/5 Shoulder abduction: 4-/5   Shoulder ER 5/5 Shoulder ER 3+/5   Shoulder IR 5/5 Shoulder IR 3+/5   Elbow flexion: 5/5 Elbow flexion: 3+/5   Elbow extension: 5/5 Elbow extension: 4/5   Wrist flexion: 4-/5 reports carpel tunnel surgery in January on right Wrist flexion: 4/5   Wrist extension: 5/5 Wrist extension: 4/5       Special Tests:  Distraction Increased pain   Spurlings Increased pain with bilateral spurlings   Compression Increased pain      Cervical joint mobility: hypomobile throughout and painful at lower cervical levels       Thoracic mobility: significant hypomobility throughout    Palpation: tenderness in left infraspinatus,  "upper traps  No tenderness in right      Sensation: altered sensation throughout upper extremity dermatomes    Flexibility: limited pec minor    Neural tension:   -ULTT Median: pos L  -ULTT Ulnar: pos L  -ULTT Radial: pos L    Intake Outcome Measure for FOTO cervical Survey    Therapist reviewed FOTO scores for Matthew Austin on 10/11/2023.   FOTO report - see Media section or FOTO account episode details.    Intake Score: 35%         Treatment     Total Treatment time (time-based codes) separate from Evaluation: 10 minutes     Lui received the treatments listed below:      therapeutic exercises to develop strength, endurance, ROM, flexibility, posture, and core stabilization for 10 minutes including:  Scap squeeze 20x3"  Seated upper trunk rotations 15x3" B    manual therapy techniques: Joint mobilizations, Manual traction, and Soft tissue Mobilization were applied for 0 minutes, including:      neuromuscular re-education activities to improve: Balance, Proprioception, and Posture for 0 minutes. The following activities were included:      therapeutic activities to improve functional performance for 0  minutes, including:        Patient Education and Home Exercises     Education provided:   - education on condition  -home exercise program    Written Home Exercises Provided: yes. Exercises were reviewed and Lui was able to demonstrate them prior to the end of the session.  Lui demonstrated good  understanding of the education provided. See EMR under Patient Instructions for exercises provided during therapy sessions.    Assessment     Matthew Tovar is a 33 y.o. male referred to outpatient Physical Therapy with a medical diagnosis of Cervicalgia [M54.2]. Patient presents with chronic history of neck pain with no known mechanism of injury. He presents today with postural dysfunction, upper extremity weakness, poor cervical range of motion, positive neural tension testing, and pain with function. There " is likely some component of central sensitization due to chronicity of condition making assessment difficult today as patient has frequent pain response of palpation. He will benefit from skilled PT to address his deficits and restore max function to participate fully in work and improve quality of life.     Patient prognosis is Good.   Patient will benefit from skilled outpatient Physical Therapy to address the deficits stated above and in the chart below, provide patient /family education, and to maximize patientt's level of independence.     Plan of care discussed with patient: Yes  Patient's spiritual, cultural and educational needs considered and patient is agreeable to the plan of care and goals as stated below:     Anticipated Barriers for therapy: none    Medical Necessity is demonstrated by the following  History  Co-morbidities and personal factors that may impact the plan of care [x] LOW: no personal factors / co-morbidities  [] MODERATE: 1-2 personal factors / co-morbidities  [] HIGH: 3+ personal factors / co-morbidities    Moderate / High Support Documentation:   Co-morbidities affecting plan of care:     Personal Factors:   no deficits     Examination  Body Structures and Functions, activity limitations and participation restrictions that may impact the plan of care [x] LOW: addressing 1-2 elements  [] MODERATE: 3+ elements  [] HIGH: 4+ elements (please support below)    Moderate / High Support Documentation:      Clinical Presentation [x] LOW: stable  [] MODERATE: Evolving  [] HIGH: Unstable     Decision Making/ Complexity Score: low         GOALS: (not met, progressing unless otherwise specified)   Short Term Goals: 3 weeks  1.Report decreased neck pain  <   / =  6  /10  to increase tolerance for sleeping  2. Increase cervical ROM by 5-10 degrees in order to perform ADLs with decreased difficulty.  3. Increase strength in B shoulders/scapular stabilizers by 1/3 MMT grade to increase tolerance for ADL  and work activities.  4. Pt to tolerate HEP to improve ROM and independence with ADL's    Long Term Goals: 6 weeks  1.Report decreased neck pain  <   / =  3  /10  to increase tolerance for working  2. Increase UE/neck flexibility in order to improve posture.    3.Increased strength in B shoulders/scapular stabilizers to >/= 4/5 MMT grade to increase tolerance for ADL and work activities.  4.  Pt will report at CJ level (20-40% impaired) on FOTO neck score for neck pain disability to demonstrate decrease in disability and improvement in neck pain.   5. Pt to demonstrate negative neural tension testing for right arm    Plan     Plan of care Certification: 10/11/2023 to 1/11/24.    Outpatient Physical Therapy 2 times weekly for 6 weeks to include the following interventions: Aquatic Therapy, Cervical/Lumbar Traction, Electrical Stimulation  , Gait Training, Manual Therapy, Moist Heat/ Ice, Neuromuscular Re-ed, Patient Education, Therapeutic Activities, Therapeutic Exercise, and Ultrasound.     Florinda Contreras, PT        Physician's Signature: _________________________________________ Date: ________________

## 2024-11-27 DIAGNOSIS — S83.249A MEDIAL MENISCUS TEAR: Primary | ICD-10-CM
